# Patient Record
Sex: MALE | Race: OTHER | HISPANIC OR LATINO | Employment: FULL TIME | URBAN - METROPOLITAN AREA
[De-identification: names, ages, dates, MRNs, and addresses within clinical notes are randomized per-mention and may not be internally consistent; named-entity substitution may affect disease eponyms.]

---

## 2018-09-18 ENCOUNTER — ANESTHESIA (INPATIENT)
Dept: PERIOP | Facility: HOSPITAL | Age: 48
DRG: 728 | End: 2018-09-18

## 2018-09-18 ENCOUNTER — APPOINTMENT (EMERGENCY)
Dept: CT IMAGING | Facility: HOSPITAL | Age: 48
DRG: 728 | End: 2018-09-18

## 2018-09-18 ENCOUNTER — ANESTHESIA EVENT (INPATIENT)
Dept: PERIOP | Facility: HOSPITAL | Age: 48
DRG: 728 | End: 2018-09-18

## 2018-09-18 ENCOUNTER — HOSPITAL ENCOUNTER (INPATIENT)
Facility: HOSPITAL | Age: 48
LOS: 2 days | Discharge: HOME | DRG: 728 | End: 2018-09-20
Attending: EMERGENCY MEDICINE | Admitting: FAMILY MEDICINE

## 2018-09-18 DIAGNOSIS — L02.91 ABSCESS: ICD-10-CM

## 2018-09-18 DIAGNOSIS — N49.2 SCROTAL WALL ABSCESS: Primary | ICD-10-CM

## 2018-09-18 LAB
ALBUMIN SERPL BCP-MCNC: 3.5 G/DL (ref 3.5–5)
ALP SERPL-CCNC: 97 U/L (ref 46–116)
ALT SERPL W P-5'-P-CCNC: 17 U/L (ref 12–78)
ANION GAP SERPL CALCULATED.3IONS-SCNC: 7 MMOL/L (ref 4–13)
APTT PPP: 26 SECONDS (ref 24–36)
AST SERPL W P-5'-P-CCNC: 13 U/L (ref 5–45)
BACTERIA UR QL AUTO: ABNORMAL /HPF
BASOPHILS # BLD AUTO: 0.03 THOUSANDS/ΜL (ref 0–0.1)
BASOPHILS NFR BLD AUTO: 0 % (ref 0–1)
BILIRUB SERPL-MCNC: 0.63 MG/DL (ref 0.2–1)
BILIRUB UR QL STRIP: NEGATIVE
BUN SERPL-MCNC: 16 MG/DL (ref 5–25)
CALCIUM SERPL-MCNC: 8.6 MG/DL (ref 8.3–10.1)
CHLORIDE SERPL-SCNC: 103 MMOL/L (ref 100–108)
CLARITY UR: CLEAR
CO2 SERPL-SCNC: 28 MMOL/L (ref 21–32)
COLOR UR: YELLOW
CREAT SERPL-MCNC: 1.1 MG/DL (ref 0.6–1.3)
EOSINOPHIL # BLD AUTO: 0.08 THOUSAND/ΜL (ref 0–0.61)
EOSINOPHIL NFR BLD AUTO: 1 % (ref 0–6)
ERYTHROCYTE [DISTWIDTH] IN BLOOD BY AUTOMATED COUNT: 12.3 % (ref 11.6–15.1)
GFR SERPL CREATININE-BSD FRML MDRD: 78 ML/MIN/1.73SQ M
GLUCOSE SERPL-MCNC: 93 MG/DL (ref 65–140)
GLUCOSE UR STRIP-MCNC: NEGATIVE MG/DL
HCT VFR BLD AUTO: 40.8 % (ref 36.5–49.3)
HGB BLD-MCNC: 13.3 G/DL (ref 12–17)
HGB UR QL STRIP.AUTO: NEGATIVE
IMM GRANULOCYTES # BLD AUTO: 0.02 THOUSAND/UL (ref 0–0.2)
IMM GRANULOCYTES NFR BLD AUTO: 0 % (ref 0–2)
INR PPP: 1.1 (ref 0.86–1.17)
KETONES UR STRIP-MCNC: NEGATIVE MG/DL
LACTATE SERPL-SCNC: 0.5 MMOL/L (ref 0.5–2)
LACTATE SERPL-SCNC: 0.5 MMOL/L (ref 0.5–2)
LEUKOCYTE ESTERASE UR QL STRIP: NEGATIVE
LYMPHOCYTES # BLD AUTO: 1.76 THOUSANDS/ΜL (ref 0.6–4.47)
LYMPHOCYTES NFR BLD AUTO: 20 % (ref 14–44)
MCH RBC QN AUTO: 29.8 PG (ref 26.8–34.3)
MCHC RBC AUTO-ENTMCNC: 32.6 G/DL (ref 31.4–37.4)
MCV RBC AUTO: 91 FL (ref 82–98)
MONOCYTES # BLD AUTO: 0.8 THOUSAND/ΜL (ref 0.17–1.22)
MONOCYTES NFR BLD AUTO: 9 % (ref 4–12)
NEUTROPHILS # BLD AUTO: 6.2 THOUSANDS/ΜL (ref 1.85–7.62)
NEUTS SEG NFR BLD AUTO: 70 % (ref 43–75)
NITRITE UR QL STRIP: NEGATIVE
NON-SQ EPI CELLS URNS QL MICRO: ABNORMAL /HPF
NRBC BLD AUTO-RTO: 0 /100 WBCS
PH UR STRIP.AUTO: 7 [PH] (ref 4.5–8)
PLATELET # BLD AUTO: 276 THOUSANDS/UL (ref 149–390)
PMV BLD AUTO: 8.9 FL (ref 8.9–12.7)
POTASSIUM SERPL-SCNC: 3.6 MMOL/L (ref 3.5–5.3)
PROT SERPL-MCNC: 7.8 G/DL (ref 6.4–8.2)
PROT UR STRIP-MCNC: ABNORMAL MG/DL
PROTHROMBIN TIME: 14.3 SECONDS (ref 11.8–14.2)
RBC # BLD AUTO: 4.47 MILLION/UL (ref 3.88–5.62)
RBC #/AREA URNS AUTO: ABNORMAL /HPF
SODIUM SERPL-SCNC: 138 MMOL/L (ref 136–145)
SP GR UR STRIP.AUTO: 1.02 (ref 1–1.03)
UROBILINOGEN UR QL STRIP.AUTO: 2 E.U./DL
WBC # BLD AUTO: 8.89 THOUSAND/UL (ref 4.31–10.16)
WBC #/AREA URNS AUTO: ABNORMAL /HPF

## 2018-09-18 PROCEDURE — 99253 IP/OBS CNSLTJ NEW/EST LOW 45: CPT | Performed by: PHYSICIAN ASSISTANT

## 2018-09-18 PROCEDURE — 87040 BLOOD CULTURE FOR BACTERIA: CPT | Performed by: PHYSICIAN ASSISTANT

## 2018-09-18 PROCEDURE — 96375 TX/PRO/DX INJ NEW DRUG ADDON: CPT

## 2018-09-18 PROCEDURE — 85025 COMPLETE CBC W/AUTO DIFF WBC: CPT | Performed by: PHYSICIAN ASSISTANT

## 2018-09-18 PROCEDURE — 99284 EMERGENCY DEPT VISIT MOD MDM: CPT

## 2018-09-18 PROCEDURE — 11004 DBRDMT SKIN XTRNL GENT&PER: CPT | Performed by: UROLOGY

## 2018-09-18 PROCEDURE — 0V95XZZ DRAINAGE OF SCROTUM, EXTERNAL APPROACH: ICD-10-PCS | Performed by: UROLOGY

## 2018-09-18 PROCEDURE — 81001 URINALYSIS AUTO W/SCOPE: CPT | Performed by: PHYSICIAN ASSISTANT

## 2018-09-18 PROCEDURE — 88304 TISSUE EXAM BY PATHOLOGIST: CPT | Performed by: PATHOLOGY

## 2018-09-18 PROCEDURE — 85610 PROTHROMBIN TIME: CPT | Performed by: PHYSICIAN ASSISTANT

## 2018-09-18 PROCEDURE — 36415 COLL VENOUS BLD VENIPUNCTURE: CPT | Performed by: PHYSICIAN ASSISTANT

## 2018-09-18 PROCEDURE — 72193 CT PELVIS W/DYE: CPT

## 2018-09-18 PROCEDURE — 83605 ASSAY OF LACTIC ACID: CPT | Performed by: PHYSICIAN ASSISTANT

## 2018-09-18 PROCEDURE — 87205 SMEAR GRAM STAIN: CPT | Performed by: UROLOGY

## 2018-09-18 PROCEDURE — 96365 THER/PROPH/DIAG IV INF INIT: CPT

## 2018-09-18 PROCEDURE — 85730 THROMBOPLASTIN TIME PARTIAL: CPT | Performed by: PHYSICIAN ASSISTANT

## 2018-09-18 PROCEDURE — 87070 CULTURE OTHR SPECIMN AEROBIC: CPT | Performed by: UROLOGY

## 2018-09-18 PROCEDURE — 80053 COMPREHEN METABOLIC PANEL: CPT | Performed by: PHYSICIAN ASSISTANT

## 2018-09-18 PROCEDURE — 87075 CULTR BACTERIA EXCEPT BLOOD: CPT | Performed by: UROLOGY

## 2018-09-18 PROCEDURE — 54700 I&D EPDIDYMS TSTIS&/SCROT SP: CPT | Performed by: UROLOGY

## 2018-09-18 PROCEDURE — 99223 1ST HOSP IP/OBS HIGH 75: CPT | Performed by: INTERNAL MEDICINE

## 2018-09-18 RX ORDER — LIDOCAINE HYDROCHLORIDE 10 MG/ML
INJECTION, SOLUTION INFILTRATION; PERINEURAL AS NEEDED
Status: DISCONTINUED | OUTPATIENT
Start: 2018-09-18 | End: 2018-09-18 | Stop reason: SURG

## 2018-09-18 RX ORDER — FENTANYL CITRATE 50 UG/ML
INJECTION, SOLUTION INTRAMUSCULAR; INTRAVENOUS AS NEEDED
Status: DISCONTINUED | OUTPATIENT
Start: 2018-09-18 | End: 2018-09-18 | Stop reason: SURG

## 2018-09-18 RX ORDER — MORPHINE SULFATE 2 MG/ML
2 INJECTION, SOLUTION INTRAMUSCULAR; INTRAVENOUS
Status: DISCONTINUED | OUTPATIENT
Start: 2018-09-18 | End: 2018-09-19

## 2018-09-18 RX ORDER — OXYCODONE HYDROCHLORIDE AND ACETAMINOPHEN 5; 325 MG/1; MG/1
2 TABLET ORAL EVERY 4 HOURS PRN
Status: DISCONTINUED | OUTPATIENT
Start: 2018-09-18 | End: 2018-09-19

## 2018-09-18 RX ORDER — OXYCODONE HYDROCHLORIDE AND ACETAMINOPHEN 5; 325 MG/1; MG/1
1 TABLET ORAL EVERY 4 HOURS PRN
Status: DISCONTINUED | OUTPATIENT
Start: 2018-09-18 | End: 2018-09-19

## 2018-09-18 RX ORDER — PROPOFOL 10 MG/ML
INJECTION, EMULSION INTRAVENOUS AS NEEDED
Status: DISCONTINUED | OUTPATIENT
Start: 2018-09-18 | End: 2018-09-18 | Stop reason: SURG

## 2018-09-18 RX ORDER — MIDAZOLAM HYDROCHLORIDE 1 MG/ML
INJECTION INTRAMUSCULAR; INTRAVENOUS AS NEEDED
Status: DISCONTINUED | OUTPATIENT
Start: 2018-09-18 | End: 2018-09-18 | Stop reason: SURG

## 2018-09-18 RX ORDER — HYDROMORPHONE HYDROCHLORIDE 2 MG/ML
INJECTION, SOLUTION INTRAMUSCULAR; INTRAVENOUS; SUBCUTANEOUS AS NEEDED
Status: DISCONTINUED | OUTPATIENT
Start: 2018-09-18 | End: 2018-09-18 | Stop reason: SURG

## 2018-09-18 RX ORDER — KETOROLAC TROMETHAMINE 30 MG/ML
15 INJECTION, SOLUTION INTRAMUSCULAR; INTRAVENOUS EVERY 6 HOURS PRN
Status: DISCONTINUED | OUTPATIENT
Start: 2018-09-18 | End: 2018-09-19

## 2018-09-18 RX ORDER — SODIUM CHLORIDE 9 MG/ML
75 INJECTION, SOLUTION INTRAVENOUS CONTINUOUS
Status: DISCONTINUED | OUTPATIENT
Start: 2018-09-18 | End: 2018-09-19

## 2018-09-18 RX ORDER — ACETAMINOPHEN 325 MG/1
650 TABLET ORAL EVERY 6 HOURS PRN
Status: DISCONTINUED | OUTPATIENT
Start: 2018-09-18 | End: 2018-09-20 | Stop reason: HOSPADM

## 2018-09-18 RX ORDER — SODIUM CHLORIDE 9 MG/ML
125 INJECTION, SOLUTION INTRAVENOUS CONTINUOUS
Status: DISCONTINUED | OUTPATIENT
Start: 2018-09-18 | End: 2018-09-18

## 2018-09-18 RX ORDER — ONDANSETRON 2 MG/ML
4 INJECTION INTRAMUSCULAR; INTRAVENOUS ONCE AS NEEDED
Status: DISCONTINUED | OUTPATIENT
Start: 2018-09-18 | End: 2018-09-18 | Stop reason: HOSPADM

## 2018-09-18 RX ORDER — FENTANYL CITRATE/PF 50 MCG/ML
25 SYRINGE (ML) INJECTION
Status: DISCONTINUED | OUTPATIENT
Start: 2018-09-18 | End: 2018-09-18 | Stop reason: HOSPADM

## 2018-09-18 RX ORDER — ONDANSETRON 2 MG/ML
4 INJECTION INTRAMUSCULAR; INTRAVENOUS EVERY 6 HOURS PRN
Status: DISCONTINUED | OUTPATIENT
Start: 2018-09-18 | End: 2018-09-20

## 2018-09-18 RX ORDER — KETOROLAC TROMETHAMINE 30 MG/ML
15 INJECTION, SOLUTION INTRAMUSCULAR; INTRAVENOUS ONCE
Status: COMPLETED | OUTPATIENT
Start: 2018-09-18 | End: 2018-09-18

## 2018-09-18 RX ORDER — SODIUM CHLORIDE 9 MG/ML
100 INJECTION, SOLUTION INTRAVENOUS CONTINUOUS
Status: DISCONTINUED | OUTPATIENT
Start: 2018-09-18 | End: 2018-09-18

## 2018-09-18 RX ORDER — MAGNESIUM HYDROXIDE 1200 MG/15ML
LIQUID ORAL AS NEEDED
Status: DISCONTINUED | OUTPATIENT
Start: 2018-09-18 | End: 2018-09-18 | Stop reason: HOSPADM

## 2018-09-18 RX ORDER — SODIUM CHLORIDE 9 MG/ML
125 INJECTION, SOLUTION INTRAVENOUS CONTINUOUS
Status: CANCELLED | OUTPATIENT
Start: 2018-09-18

## 2018-09-18 RX ORDER — MORPHINE SULFATE 4 MG/ML
4 INJECTION, SOLUTION INTRAMUSCULAR; INTRAVENOUS EVERY 2 HOUR PRN
Status: DISCONTINUED | OUTPATIENT
Start: 2018-09-18 | End: 2018-09-19

## 2018-09-18 RX ADMIN — FENTANYL CITRATE 25 MCG: 50 INJECTION, SOLUTION INTRAMUSCULAR; INTRAVENOUS at 18:50

## 2018-09-18 RX ADMIN — SODIUM CHLORIDE 75 ML/HR: 0.9 INJECTION, SOLUTION INTRAVENOUS at 12:45

## 2018-09-18 RX ADMIN — HYDROMORPHONE HYDROCHLORIDE 0.5 MG: 2 INJECTION, SOLUTION INTRAMUSCULAR; INTRAVENOUS; SUBCUTANEOUS at 18:16

## 2018-09-18 RX ADMIN — SODIUM CHLORIDE 125 ML/HR: 0.9 INJECTION, SOLUTION INTRAVENOUS at 03:49

## 2018-09-18 RX ADMIN — PIPERACILLIN SODIUM AND TAZOBACTAM SODIUM 3.38 G: 36; 4.5 INJECTION, POWDER, FOR SOLUTION INTRAVENOUS at 02:59

## 2018-09-18 RX ADMIN — Medication 3.38 G: at 08:07

## 2018-09-18 RX ADMIN — ONDANSETRON HYDROCHLORIDE 4 MG: 2 INJECTION, SOLUTION INTRAVENOUS at 18:07

## 2018-09-18 RX ADMIN — Medication 3.38 G: at 14:31

## 2018-09-18 RX ADMIN — LIDOCAINE HYDROCHLORIDE 60 MG: 10 INJECTION, SOLUTION INFILTRATION; PERINEURAL at 17:58

## 2018-09-18 RX ADMIN — KETOROLAC TROMETHAMINE 15 MG: 30 INJECTION, SOLUTION INTRAMUSCULAR at 01:51

## 2018-09-18 RX ADMIN — FENTANYL CITRATE 50 MCG: 50 INJECTION, SOLUTION INTRAMUSCULAR; INTRAVENOUS at 18:16

## 2018-09-18 RX ADMIN — FENTANYL CITRATE 50 MCG: 50 INJECTION, SOLUTION INTRAMUSCULAR; INTRAVENOUS at 18:00

## 2018-09-18 RX ADMIN — OXYCODONE AND ACETAMINOPHEN 1 TABLET: 5; 325 TABLET ORAL at 21:29

## 2018-09-18 RX ADMIN — IOHEXOL 100 ML: 350 INJECTION, SOLUTION INTRAVENOUS at 02:35

## 2018-09-18 RX ADMIN — DEXAMETHASONE SODIUM PHOSPHATE 4 MG: 10 INJECTION INTRAMUSCULAR; INTRAVENOUS at 18:07

## 2018-09-18 RX ADMIN — SODIUM CHLORIDE 75 ML/HR: 0.9 INJECTION, SOLUTION INTRAVENOUS at 19:19

## 2018-09-18 RX ADMIN — MIDAZOLAM 2 MG: 1 INJECTION INTRAMUSCULAR; INTRAVENOUS at 17:53

## 2018-09-18 RX ADMIN — FENTANYL CITRATE 25 MCG: 50 INJECTION, SOLUTION INTRAMUSCULAR; INTRAVENOUS at 18:55

## 2018-09-18 RX ADMIN — HYDROMORPHONE HYDROCHLORIDE 0.5 MG: 2 INJECTION, SOLUTION INTRAMUSCULAR; INTRAVENOUS; SUBCUTANEOUS at 18:11

## 2018-09-18 RX ADMIN — PROPOFOL 200 MG: 10 INJECTION, EMULSION INTRAVENOUS at 17:58

## 2018-09-18 RX ADMIN — VANCOMYCIN HYDROCHLORIDE 1500 MG: 1 INJECTION, POWDER, LYOPHILIZED, FOR SOLUTION INTRAVENOUS at 03:48

## 2018-09-18 RX ADMIN — Medication 3.38 G: at 21:29

## 2018-09-18 RX ADMIN — KETOROLAC TROMETHAMINE 15 MG: 30 INJECTION, SOLUTION INTRAMUSCULAR at 10:11

## 2018-09-18 NOTE — LETTER
[id number] [facility]  Dept: [phone]    September 20, 2018     Patient: Alfa Cedeño   YOB: 1970   Date of Visit: 9/18/2018       To Whom it May Concern:    Alfa Cedeño is under my professional care  He was seen in the hospital from 9/18/2018   to 09/20/18  Patient may return to work on September 24, 2018  If you have any questions or concerns, please don't hesitate to call           Sincerely,          Beth Mckee, DO

## 2018-09-18 NOTE — OP NOTE
OPERATIVE REPORT  PATIENT NAME: Morro Sue    :  1970  MRN: [id number]  Pt Location: [facility] OR ROOM 03    SURGERY DATE: 2018    Surgeon(s) and Role:     * Sage Holguin MD - Primary    Preop Diagnosis:  Abscess [L02 91]  Scrotal wall abscess [N49 2]    Post-Op Diagnosis Codes:     * Abscess [L02 91]     * Scrotal wall abscess [N49 2]    Procedure(s) (LRB):  INCISION AND DRAINAGE (I&D) SCROTUM (N/A)    Specimen(s):  ID Type Source Tests Collected by Time Destination   1 : Forgein body inside left scrotal wall abscess  Tissue Groin TISSUE EXAM Sage Holguin MD 2018    A : Aerobic and Anaerobic cultures of Left Scrotal Abscess Wound Groin ANAEROBIC CULTURE AND GRAM STAIN, WOUND CULTURE Sage Holguin MD 2018        Estimated Blood Loss:   Minimal    Drains:       Anesthesia Type:   General    Operative Indications:  Abscess [L02 91]  Scrotal wall abscess [N49 2]      Operative Findings:  3 cm abscess left posterior scrotal wall  There appears to be a small foreign body inside of it, described below    Complications:   None    Procedure and Technique:  After the patient was placed under adequate general anesthesia, he was prepped and draped using Betadine solution in the  supine position, with frog-leg  A small incision was made over the abscess on the left lateral scrotal wall, almost to the edge with the thigh  It was taken down through the thickened subcutaneous tissue to enter a small abscess cavity with necrotic tissue, some fat necrosis, and a small amount of purulence  As I irrigated out the abscess cavity, I could see what appeared to be a foreign body inside  It looked to be about 1 cm long, oblong shaped, something like an eggshell with the hollow Center, but made of material that  looked like a thin wall of plastic  It was definitely solid, although it fragmented some as I removed it  I sent it to pathology for identification      I irrigated the wound carefully and removed any necrotic tissue  The cavity was about 2 x 3 cm in size  Minimal cautery was required just below the skin level  After ensuring no significant purulence, cultures have been taken, and no more necrotic tissue, I packed the wound with 1/2 inch iodoform gauze  A sterile dressing applied, and patient taken to recovery in good condition     I was present for the entire procedure    Patient Disposition:  PACU     SIGNATURE: Andreas Talbert MD  DATE: September 18, 2018  TIME: 6:36 PM

## 2018-09-18 NOTE — ANESTHESIA PREPROCEDURE EVALUATION
Review of Systems/Medical History          Cardiovascular  Negative cardio ROS    Pulmonary  Negative pulmonary ROS        GI/Hepatic    GERD well controlled,          Comment: Scrotal abscess     Endo/Other  Negative endo/other ROS      GYN       Hematology  Negative hematology ROS      Musculoskeletal  Negative musculoskeletal ROS        Neurology  Negative neurology ROS      Psychology           Physical Exam    Airway    Mallampati score: I  TM Distance: >3 FB  Neck ROM: full     Dental   No notable dental hx     Cardiovascular  Comment: Negative ROS, Rhythm: regular, Rate: normal, Cardiovascular exam normal    Pulmonary  Pulmonary exam normal     Other Findings        Anesthesia Plan  ASA Score- 2 Emergent    Anesthesia Type- general with ASA Monitors  Additional Monitors:   Airway Plan:     Comment: Anesthesia consent placed on the chart 9/18/18 at 0800  Plan Factors-    Induction- intravenous  Postoperative Plan-     Informed Consent- Anesthetic plan and risks discussed with patient
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
## 2018-09-18 NOTE — ASSESSMENT & PLAN NOTE
Discharge home per Internal Medicine  Outpatient wound care follow-up with daily packing  Augmentin for ongoing coverage
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
## 2018-09-18 NOTE — ANESTHESIA POSTPROCEDURE EVALUATION
Post-Op Assessment Note      CV Status:  Stable    Mental Status:  Alert and awake    Hydration Status:  Euvolemic    PONV Controlled:  Controlled    Airway Patency:  Patent    Post Op Vitals Reviewed:  Yes              /79 (09/18/18 1904)    Temp 98 1 °F (36 7 °C) (09/18/18 1904)    Pulse 72 (09/18/18 1904)   Resp 14 (09/18/18 1904)    SpO2 96 % (09/18/18 1904)

## 2018-09-19 LAB
ANION GAP SERPL CALCULATED.3IONS-SCNC: 6 MMOL/L (ref 4–13)
BUN SERPL-MCNC: 10 MG/DL (ref 5–25)
CALCIUM SERPL-MCNC: 8.8 MG/DL (ref 8.3–10.1)
CHLORIDE SERPL-SCNC: 104 MMOL/L (ref 100–108)
CO2 SERPL-SCNC: 28 MMOL/L (ref 21–32)
CREAT SERPL-MCNC: 0.88 MG/DL (ref 0.6–1.3)
GFR SERPL CREATININE-BSD FRML MDRD: 101 ML/MIN/1.73SQ M
GLUCOSE SERPL-MCNC: 122 MG/DL (ref 65–140)
POTASSIUM SERPL-SCNC: 4.4 MMOL/L (ref 3.5–5.3)
SODIUM SERPL-SCNC: 138 MMOL/L (ref 136–145)

## 2018-09-19 PROCEDURE — 80048 BASIC METABOLIC PNL TOTAL CA: CPT | Performed by: INTERNAL MEDICINE

## 2018-09-19 PROCEDURE — 99232 SBSQ HOSP IP/OBS MODERATE 35: CPT | Performed by: INTERNAL MEDICINE

## 2018-09-19 RX ORDER — KETOROLAC TROMETHAMINE 30 MG/ML
15 INJECTION, SOLUTION INTRAMUSCULAR; INTRAVENOUS EVERY 6 HOURS PRN
Status: DISCONTINUED | OUTPATIENT
Start: 2018-09-19 | End: 2018-09-20 | Stop reason: HOSPADM

## 2018-09-19 RX ORDER — MORPHINE SULFATE 4 MG/ML
4 INJECTION, SOLUTION INTRAMUSCULAR; INTRAVENOUS EVERY 6 HOURS PRN
Status: DISCONTINUED | OUTPATIENT
Start: 2018-09-19 | End: 2018-09-19

## 2018-09-19 RX ORDER — MORPHINE SULFATE 4 MG/ML
4 INJECTION, SOLUTION INTRAMUSCULAR; INTRAVENOUS EVERY 4 HOURS PRN
Status: DISCONTINUED | OUTPATIENT
Start: 2018-09-19 | End: 2018-09-20

## 2018-09-19 RX ADMIN — KETOROLAC TROMETHAMINE 15 MG: 30 INJECTION, SOLUTION INTRAMUSCULAR at 23:32

## 2018-09-19 RX ADMIN — Medication 3.38 G: at 08:35

## 2018-09-19 RX ADMIN — OXYCODONE AND ACETAMINOPHEN 1 TABLET: 5; 325 TABLET ORAL at 08:36

## 2018-09-19 RX ADMIN — MORPHINE SULFATE 4 MG: 4 INJECTION INTRAVENOUS at 12:55

## 2018-09-19 RX ADMIN — Medication 3.38 G: at 21:22

## 2018-09-19 RX ADMIN — SODIUM CHLORIDE 75 ML/HR: 0.9 INJECTION, SOLUTION INTRAVENOUS at 08:38

## 2018-09-19 RX ADMIN — Medication 3.38 G: at 15:42

## 2018-09-19 RX ADMIN — OXYCODONE AND ACETAMINOPHEN 1 TABLET: 5; 325 TABLET ORAL at 03:12

## 2018-09-19 RX ADMIN — MORPHINE SULFATE 4 MG: 4 INJECTION INTRAVENOUS at 18:45

## 2018-09-19 RX ADMIN — Medication 3.38 G: at 03:13

## 2018-09-19 NOTE — PROGRESS NOTES
Progress Note - Javy Gunn 51 y o  male MRN: [id number]    Unit/Bed#: [facility] 2 -01 Encounter: [id number]    Assessment/Plan:    Scrotal abscess   status post I and D, and foreign body removal continue Zosyn in monitor cultures, blood cultures no growth 24 hours wound culture Gram stain gram-positive cocci    Intractable testicular pain  continue Toradol and morphine    Subjective:   Right scrotum pain persists, denies chest pain shortness of breath nausea vomiting diarrhea no fever chills appetite is okay    Objective:     Vitals: Blood pressure 122/76, pulse 59, temperature (!) 97 2 °F (36 2 °C), temperature source Temporal, resp  rate 17, height 6' 3" (1 905 m), weight 95 1 kg (209 lb 10 5 oz), SpO2 97 %  ,Body mass index is 26 21 kg/m²          Results from last 7 days  Lab Units 09/18/18  0147 09/18/18  0146   WBC Thousand/uL  --  8 89   HEMOGLOBIN g/dL  --  13 3   HEMATOCRIT %  --  40 8   PLATELETS Thousands/uL  --  276   INR  1 10  --        Results from last 7 days  Lab Units 09/19/18  0510 09/18/18  0147   SODIUM mmol/L 138 138   POTASSIUM mmol/L 4 4 3 6   CHLORIDE mmol/L 104 103   CO2 mmol/L 28 28   BUN mg/dL 10 16   CREATININE mg/dL 0 88 1 10   CALCIUM mg/dL 8 8 8 6   ALK PHOS U/L  --  97   ALT U/L  --  17   AST U/L  --  13       Scheduled Meds:    Current Facility-Administered Medications:  acetaminophen 650 mg Oral Q6H PRN Lang Dia PA-C    ketorolac 15 mg Intravenous Q6H PRN Lang Dia PA-C    morphine injection 4 mg Intravenous Q2H PRN Wayne Bella MD    morphine injection 2 mg Intravenous Q3H PRN Walker Chamberlain PA-C    ondansetron 4 mg Intravenous Q6H PRN Lang Dia PA-C    oxyCODONE-acetaminophen 1 tablet Oral Q4H PRN Wayne Bella MD    oxyCODONE-acetaminophen 2 tablet Oral Q4H PRN Wayne Bella MD    piperacillin-tazobactam 3 375 g Intravenous Q6H Lang Dia PA-C Last Rate: Stopped (09/19/18 0905)   sodium chloride 75 mL/hr Intravenous Continuous Saida Harvey Mikhail Heck DO Last Rate: 75 mL/hr (09/19/18 4484)       Continuous Infusions:    sodium chloride 75 mL/hr Last Rate: 75 mL/hr (09/19/18 9310)     Physical exam:  General appearance:  Alert no distress oriented x3 interaction appropriate  Head/Eyes:  Nonicteric PERRL EOMI  Neck: supple  Lungs: CTA bilateral no wheezing rhonchi or rales  Heart: normal S1 S2 regular  Abdomen: Soft nontender with bowel sounds  Extremities: no edema scrotum gauze with packing  Skin: no rash    Invasive Devices     Peripheral Intravenous Line            Peripheral IV 09/18/18 Right Antecubital 1 day                      Counseling / Coordination of Care  Total floor / unit time spent today 30  minutes  Greater than 50% of total time was spent with the patient and / or family counseling and / or coordination of care    A description of the counseling / coordination of care:

## 2018-09-19 NOTE — PHYSICIAN ADVISOR
Current patient class: Inpatient  The patient is currently on Hospital Day: 1 at [address] [facility]      The patient was admitted to the hospital at [address] [facility] on 9/18/18 for the following diagnosis:  Abscess [L02 91]  Scrotal wall abscess [N49 2]       There is documentation in the medical record of an expected length of stay of at least 2 midnights  The patient is therefore expected to satisfy the 2 midnight benchmark and given the 2 midnight presumption is appropriate for INPATIENT ADMISSION  Given this expectation of a satisfying stay, CMS instructs us that the patient is most often appropriate for inpatient admission under part A provided medical necessity is documented in the chart  After review of the relevant documentation, labs, vital signs and test results, the patient is appropriate for INPATIENT ADMISSION  Admission to the hospital as an inpatient is a complex decision making process which requires the practitioner to consider the patients presenting complaint, history and physical examination and all relevant testing  With this in mind, in this case, the patient was deemed appropriate for INPATIENT ADMISSION  After review of the documentation and testing available at the time of the admission I concur with this clinical determination of medical necessity  Rationale is as follows: The patient is a 51 yrs old Male who presented to the ED at 9/18/2018  1:18 AM with a chief complaint of Abscess (Pt states "lump on side of testicle  Pus and blood came out of it")     Given the need for further hospitalization, and along with the documentation of medical necessity present in the chart, the patient is appropriate for inpatient admission  The patient is expected to satisfy the 2 midnight benchmark, and will require further acute medical care  The patient does have comorbid conditions which increases the risk for significant adverse outcome   Given this the patient is appropriate for inpatient admission  The patients vitals on arrival were ED Triage Vitals   Temperature Pulse Respirations Blood Pressure SpO2   09/18/18 0120 09/18/18 0120 09/18/18 0120 09/18/18 0120 09/18/18 0120   98 3 °F (36 8 °C) 85 18 137/87 98 %      Temp Source Heart Rate Source Patient Position - Orthostatic VS BP Location FiO2 (%)   09/18/18 0418 09/18/18 0759 09/18/18 0418 09/18/18 0418 --   Tympanic Monitor Lying Left arm       Pain Score       09/18/18 0120       8           History reviewed  No pertinent past medical history  History reviewed  No pertinent surgical history          Consults have been placed to:   IP CONSULT TO UROLOGY    Vitals:    09/18/18 1904 09/18/18 1928 09/18/18 2000 09/18/18 2100   BP: 123/79 123/88 117/75 117/65   BP Location:  Left arm Left arm Left arm   Pulse: 72 56 65 69   Resp: 14 16 16 16   Temp: 98 1 °F (36 7 °C) (!) 97 3 °F (36 3 °C) 97 9 °F (36 6 °C) 97 9 °F (36 6 °C)   TempSrc:  Temporal Tympanic Tympanic   SpO2: 96% 95% 96% 98%   Weight:       Height:           Most recent labs:    Recent Labs      09/18/18   0146  09/18/18   0147   WBC  8 89   --    HGB  13 3   --    HCT  40 8   --    PLT  276   --    K   --   3 6   NA   --   138   CALCIUM   --   8 6   BUN   --   16   CREATININE   --   1 10   INR   --   1 10   AST   --   13   ALT   --   17   ALKPHOS   --   97       Scheduled Meds:  Current Facility-Administered Medications:  acetaminophen 650 mg Oral Q6H PRN Alfonso Lyons PA-C    ketorolac 15 mg Intravenous Q6H PRN Alfonso Lyons PA-C    morphine injection 4 mg Intravenous Q2H PRN Candida Miguel MD    morphine injection 2 mg Intravenous Q3H PRN Walker Chamberlain PA-C    ondansetron 4 mg Intravenous Q6H PRN Alfonso Lyons PA-C    oxyCODONE-acetaminophen 1 tablet Oral Q4H PRN Candida Miguel MD    oxyCODONE-acetaminophen 2 tablet Oral Q4H PRN Candida Miguel MD    piperacillin-tazobactam 3 375 g Intravenous Q6H Alfonso LIMA Lyons Last Rate: 3 375 g (09/18/18 2129) sodium chloride 75 mL/hr Intravenous Continuous Cy Dynes, DO Last Rate: 75 mL/hr (09/18/18 1919)     Continuous Infusions:  sodium chloride 75 mL/hr Last Rate: 75 mL/hr (09/18/18 1919)     PRN Meds:   acetaminophen    ketorolac    morphine injection    morphine injection    ondansetron    oxyCODONE-acetaminophen    oxyCODONE-acetaminophen    Surgical procedures (if appropriate):  Procedure(s):  INCISION AND DRAINAGE (I&D) SCROTUM

## 2018-09-20 VITALS
HEART RATE: 67 BPM | OXYGEN SATURATION: 98 % | WEIGHT: 209.66 LBS | BODY MASS INDEX: 26.07 KG/M2 | RESPIRATION RATE: 18 BRPM | SYSTOLIC BLOOD PRESSURE: 116 MMHG | TEMPERATURE: 97.5 F | DIASTOLIC BLOOD PRESSURE: 76 MMHG | HEIGHT: 75 IN

## 2018-09-20 LAB
BACTERIA WND AEROBE CULT: NORMAL
GRAM STN SPEC: NORMAL
GRAM STN SPEC: NORMAL

## 2018-09-20 PROCEDURE — 99239 HOSP IP/OBS DSCHRG MGMT >30: CPT | Performed by: INTERNAL MEDICINE

## 2018-09-20 PROCEDURE — 99024 POSTOP FOLLOW-UP VISIT: CPT | Performed by: PHYSICIAN ASSISTANT

## 2018-09-20 RX ORDER — AMOXICILLIN AND CLAVULANATE POTASSIUM 875; 125 MG/1; MG/1
1 TABLET, FILM COATED ORAL EVERY 12 HOURS SCHEDULED
Status: DISCONTINUED | OUTPATIENT
Start: 2018-09-21 | End: 2018-09-20 | Stop reason: HOSPADM

## 2018-09-20 RX ORDER — ACETAMINOPHEN 325 MG/1
TABLET ORAL
Qty: 30 TABLET | Refills: 0
Start: 2018-09-20 | End: 2022-03-23

## 2018-09-20 RX ORDER — TRAMADOL HYDROCHLORIDE 50 MG/1
50 TABLET ORAL EVERY 6 HOURS PRN
Status: DISCONTINUED | OUTPATIENT
Start: 2018-09-20 | End: 2018-09-20 | Stop reason: HOSPADM

## 2018-09-20 RX ORDER — AMOXICILLIN AND CLAVULANATE POTASSIUM 875; 125 MG/1; MG/1
1 TABLET, FILM COATED ORAL EVERY 12 HOURS SCHEDULED
Qty: 16 TABLET | Refills: 0 | Status: SHIPPED | OUTPATIENT
Start: 2018-09-21 | End: 2018-09-29

## 2018-09-20 RX ORDER — TRAMADOL HYDROCHLORIDE 50 MG/1
50 TABLET ORAL EVERY 6 HOURS PRN
Qty: 24 TABLET | Refills: 0 | Status: SHIPPED | OUTPATIENT
Start: 2018-09-20 | End: 2018-09-30

## 2018-09-20 RX ADMIN — Medication 3.38 G: at 03:36

## 2018-09-20 RX ADMIN — MORPHINE SULFATE 4 MG: 4 INJECTION INTRAVENOUS at 08:59

## 2018-09-20 RX ADMIN — Medication 3.38 G: at 15:42

## 2018-09-20 RX ADMIN — Medication 3.38 G: at 08:54

## 2018-09-20 RX ADMIN — KETOROLAC TROMETHAMINE 15 MG: 30 INJECTION, SOLUTION INTRAMUSCULAR at 11:40

## 2018-09-20 NOTE — NURSING NOTE
IV removed prior to discharge  AVS provided to patient along with written scripts for Augmentin and Tramadol  Patient aware to schedule follow-up appts with wound care center and Urology as follow up  Pt discharged to home with belongings

## 2018-09-20 NOTE — PROGRESS NOTES
Progress Note - Urology  Gallito Emerson 1970, 51 y o  male MRN: [id number]    Unit/Bed#: [facility] 68 2 -01 Encounter: [id number]    Scrotal wall abscess   Assessment & Plan     Discharge home per Internal Medicine  Outpatient wound care follow-up with daily packing  Augmentin for ongoing coverage  See Dr Judit Morrow in office once healed  Urology will sign off but remain available for any further inpatient needs  Please feel free to call with questions or concerns  Bedside rounds performed with Ghislaine Zamora RN  Discussed with Dr Judy Barron  Subjective/Objective     Subjective:    Patient reports he is feeling well  Has minimal pain except during dressing changes  Has been ambulating well  Tolerating pain with oral pain medications  Minimal drainage on the dressing in between packing changes  Has been doing packing b i d  As an inpatient  Reports he is voiding  Review of Systems   Constitutional: Negative for activity change and appetite change  HENT: Negative for congestion and ear pain  Eyes: Negative for pain  Respiratory: Negative for cough and shortness of breath  Cardiovascular: Negative for chest pain and palpitations  Gastrointestinal: Negative for abdominal distention, abdominal pain, blood in stool, constipation, diarrhea and nausea  Genitourinary: Negative for difficulty urinating, dysuria, flank pain, frequency, hematuria, penile pain, penile swelling and scrotal swelling  Musculoskeletal: Negative for arthralgias and myalgias  Skin: Negative for rash  Allergic/Immunologic: Negative for immunocompromised state  Neurological: Negative for dizziness and headaches  Hematological: Negative for adenopathy  Does not bruise/bleed easily  Psychiatric/Behavioral: Negative for agitation  The patient is not nervous/anxious  Objective:  Vitals: Blood pressure 124/80, pulse 58, temperature 97 8 °F (36 6 °C), temperature source Temporal, resp   rate 17, height 6' 3" (1 905 m), weight 95 1 kg (209 lb 10 5 oz), SpO2 97 %  ,Body mass index is 26 21 kg/m²  Intake/Output Summary (Last 24 hours) at 09/20/18 1258  Last data filed at 09/20/18 0932   Gross per 24 hour   Intake              460 ml   Output                0 ml   Net              460 ml     Invasive Devices     Peripheral Intravenous Line            Peripheral IV 09/18/18 Right Antecubital 2 days                Physical Exam   Constitutional: He is oriented to person, place, and time  He appears well-developed and well-nourished  He is cooperative  He does not appear ill  No distress  HENT:   Head: Normocephalic and atraumatic  Moist mucous membranes  Eyes: Conjunctivae and EOM are normal    Neck: Normal range of motion  Neck supple  No tracheal deviation present  Cardiovascular: Normal rate, regular rhythm and normal heart sounds  No murmur heard  Pulmonary/Chest: Effort normal and breath sounds normal  No respiratory distress  He has no wheezes  Good airflow bilaterally on deep inspiration  Abdominal: Soft  Bowel sounds are normal  He exhibits no distension and no mass  There is no tenderness  Abdomen thin soft and benign  Genitourinary:   Genitourinary Comments: Uncircumcised penis  Left groin with superficial dressing in place, treat clean dry and intact  Recently changed by nursing  Left intact at this time  Musculoskeletal: Normal range of motion  He exhibits no edema  Neurological: He is alert and oriented to person, place, and time  Skin: Skin is warm and dry  No rash noted  He is not diaphoretic  No erythema  No pallor  Psychiatric: He has a normal mood and affect  His behavior is normal  Judgment and thought content normal    Nursing note and vitals reviewed  History:  History reviewed  No pertinent past medical history    Past Surgical History:   Procedure Laterality Date    INCISION AND DRAINAGE OF WOUND N/A 9/18/2018    Procedure: INCISION AND DRAINAGE (I&D) SCROTUM; Surgeon: Ju Potter MD;  Location: [facility] OR;  Service: Urology     History reviewed  No pertinent family history    Social History     Social History    Marital status: Single     Spouse name: N/A    Number of children: N/A    Years of education: N/A     Social History Main Topics    Smoking status: Never Smoker    Smokeless tobacco: Never Used    Alcohol use Yes      Comment: social    Drug use: No    Sexual activity: Not Asked     Other Topics Concern    None     Social History Narrative    None       Labs:  Recent Labs      09/18/18   0146   WBC  8 89     Recent Labs      09/18/18   0146   HGB  13 3       Recent Labs      09/18/18   0147  09/19/18   0510   CREATININE  1 10  0 88         Ivonne Rahman PA-C  Date: 9/20/2018 Time: 12:58 PM
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
## 2018-09-20 NOTE — PROGRESS NOTES
Progress Note - Catie Chuo 51 y o  male MRN: [id number]    Unit/Bed#: [place] [address] -01 Encounter: [id number]    Assessment/Plan:    Scrotal abscess  status post I&D with foreign body removal, continue Zosyn blood culture no growth, wound culture normal yuri    Intractable testicular pain continue IV morphine, improving    Subjective:   Right scrotum pain persists specially with packing, denies chest pain shortness of breath nausea vomiting diarrhea no fevers chills appetite is okay    Objective:     Vitals: Blood pressure 124/80, pulse 58, temperature 97 8 °F (36 6 °C), temperature source Temporal, resp  rate 17, height 6' 3" (1 905 m), weight 95 1 kg (209 lb 10 5 oz), SpO2 97 %  ,Body mass index is 26 21 kg/m²          Results from last 7 days  Lab Units 09/18/18  0147 09/18/18  0146   WBC Thousand/uL  --  8 89   HEMOGLOBIN g/dL  --  13 3   HEMATOCRIT %  --  40 8   PLATELETS Thousands/uL  --  276   INR  1 10  --        Results from last 7 days  Lab Units 09/19/18  0510 09/18/18  0147   SODIUM mmol/L 138 138   POTASSIUM mmol/L 4 4 3 6   CHLORIDE mmol/L 104 103   CO2 mmol/L 28 28   BUN mg/dL 10 16   CREATININE mg/dL 0 88 1 10   CALCIUM mg/dL 8 8 8 6   ALK PHOS U/L  --  97   ALT U/L  --  17   AST U/L  --  13       Scheduled Meds:    Current Facility-Administered Medications:  acetaminophen 650 mg Oral Q6H PRN Raad Chol, PA-C    ketorolac 15 mg Intravenous Q6H PRN Bindu Beny, DO    morphine injection 4 mg Intravenous Q4H PRN Bindu Beny, DO    ondansetron 4 mg Intravenous Q6H PRN Raad Chol, PA-C    piperacillin-tazobactam 3 375 g Intravenous Q6H Raad Chol, PA-C Last Rate: Stopped (09/20/18 1720)       Continuous Infusions:     Physical exam:  General appearance:  Alert no distress interaction appropriate   Head/Eyes:   Nonicteric PERRL EOMI  Neck:   Supple  Lungs: CTA bilateral no wheezing rhonchi or rales  Heart: normal S1 S2 regular  Abdomen: Soft nontender with bowel sounds  Extremities: no edema scrotum packing with gauze  Skin: no rash    Invasive Devices     Peripheral Intravenous Line            Peripheral IV 09/18/18 Right Antecubital 2 days                    Counseling / Coordination of Care  Total floor / unit time spent today 30   minutes  Greater than 50% of total time was spent with the patient and / or family counseling and / or coordination of care    A description of the counseling / coordination of care:   Discussed with Urology

## 2018-09-21 ENCOUNTER — TELEPHONE (OUTPATIENT)
Dept: UROLOGY | Facility: MEDICAL CENTER | Age: 48
End: 2018-09-21

## 2018-09-21 LAB — BACTERIA SPEC ANAEROBE CULT: NORMAL

## 2018-09-21 NOTE — TELEPHONE ENCOUNTER
After some investigation on the pt's discharge summary pt is to go daily packing to [facility] to have and come to see Dr Ryder Getting in one week   Pt notified and is going to contact wound care

## 2018-09-23 LAB
BACTERIA BLD CULT: NORMAL
BACTERIA BLD CULT: NORMAL

## 2019-02-06 ENCOUNTER — OFFICE VISIT (OUTPATIENT)
Dept: INTERNAL MEDICINE CLINIC | Facility: CLINIC | Age: 49
End: 2019-02-06
Payer: COMMERCIAL

## 2019-02-06 VITALS
TEMPERATURE: 96.7 F | RESPIRATION RATE: 16 BRPM | HEIGHT: 75 IN | BODY MASS INDEX: 25.36 KG/M2 | SYSTOLIC BLOOD PRESSURE: 124 MMHG | WEIGHT: 204 LBS | DIASTOLIC BLOOD PRESSURE: 80 MMHG | HEART RATE: 64 BPM

## 2019-02-06 DIAGNOSIS — Z13.220 SCREENING FOR HYPERLIPIDEMIA: ICD-10-CM

## 2019-02-06 DIAGNOSIS — R35.0 URINARY FREQUENCY: Primary | ICD-10-CM

## 2019-02-06 DIAGNOSIS — Z80.0 FAMILY HISTORY OF COLON CANCER IN MOTHER: ICD-10-CM

## 2019-02-06 DIAGNOSIS — K21.9 GASTROESOPHAGEAL REFLUX DISEASE, ESOPHAGITIS PRESENCE NOT SPECIFIED: ICD-10-CM

## 2019-02-06 PROBLEM — L02.91 ABSCESS: Status: RESOLVED | Noted: 2018-09-18 | Resolved: 2019-02-06

## 2019-02-06 PROBLEM — N49.2 SCROTAL WALL ABSCESS: Status: RESOLVED | Noted: 2018-09-18 | Resolved: 2019-02-06

## 2019-02-06 LAB
SL AMB  POCT GLUCOSE, UA: NEGATIVE
SL AMB LEUKOCYTE ESTERASE,UA: NEGATIVE
SL AMB POCT BILIRUBIN,UA: NEGATIVE
SL AMB POCT BLOOD,UA: NEGATIVE
SL AMB POCT CLARITY,UA: CLEAR
SL AMB POCT COLOR,UA: YELLOW
SL AMB POCT KETONES,UA: NEGATIVE
SL AMB POCT NITRITE,UA: NEGATIVE
SL AMB POCT PH,UA: 7
SL AMB POCT SPECIFIC GRAVITY,UA: 1.01
SL AMB POCT URINE PROTEIN: NEGATIVE
SL AMB POCT UROBILINOGEN: NEGATIVE

## 2019-02-06 PROCEDURE — 81002 URINALYSIS NONAUTO W/O SCOPE: CPT | Performed by: INTERNAL MEDICINE

## 2019-02-06 PROCEDURE — 99203 OFFICE O/P NEW LOW 30 MIN: CPT | Performed by: INTERNAL MEDICINE

## 2019-02-06 NOTE — ASSESSMENT & PLAN NOTE
Check for H pylori antigen, CBC  Continues over-the-counter PPI after the testing  Advised to cut down spicy, acidic food, eat at least 2 hours before going to bed  Does not smoke and no significant use of alcohol

## 2019-02-06 NOTE — PROGRESS NOTES
Assessment/Plan:  Urinalysis done in the office did not show any leukocytes or nitrites  Will check for diabetes as the cause of urinary frequency  He was hesitant about a prostate check  Gastroesophageal reflux disease  Check for H pylori antigen, CBC  Continues over-the-counter PPI after the testing  Advised to cut down spicy, acidic food, eat at least 2 hours before going to bed  Does not smoke and no significant use of alcohol  Given his mom's history of colon cancer, I recommended he go for a screening colonoscopy  Screen for hyperlipidemia  He notes that he got his influenza vaccination in September Diagnoses and all orders for this visit:    Urinary frequency  -     CBC and differential  -     Comprehensive metabolic panel  -     Hemoglobin A1C  -     TSH, 3rd generation with Free T4 reflex  -     POCT urine dip    Gastroesophageal reflux disease, esophagitis presence not specified  -     CBC and differential  -     Comprehensive metabolic panel  -     TSH, 3rd generation with Free T4 reflex  -     H  pylori antigen, stool    Family history of colon cancer in mother  -     Ambulatory referral to Gastroenterology; Future    Screening for hyperlipidemia  -     Lipid Panel with Direct LDL reflex    Other orders  -     esomeprazole (NexIUM) 20 mg capsule; Take 20 mg by mouth every morning before breakfast          Subjective:   Chief Complaint   Patient presents with    [facility]        Patient ID: Gm Coleman is a 51 y o  male  He comes in as a [facility] patient [facility]  He notes that for the last several weeks to months he has noticed increased frequency of urination  No dysuria or discharge in the urethral area  No suprapubic pain  No back pain or fevers  He denies any hesitancy or urgency or dribbling  He has also had on and off acid reflux  He takes over-the-counter Nexium which helps his symptoms for several weeks and the symptoms return      He notes that his mom was diagnosed with colon cancer in her 62s and underwent treatment and then later on was told about recurrence for which she went for more chemotherapy  The following portions of the patient's history were reviewed and updated as appropriate: current medications, past medical history, past social history and past surgical history  PHQ-9 Depression Screening    PHQ-9:    Frequency of the following problems over the past two weeks:       Little interest or pleasure in doing things:  0 - not at all  Feeling down, depressed, or hopeless:  0 - not at all  PHQ-2 Score:  0           Current Outpatient Prescriptions:     esomeprazole (NexIUM) 20 mg capsule, Take 20 mg by mouth every morning before breakfast, Disp: , Rfl:     acetaminophen (TYLENOL) 325 mg tablet, 650 milligrams every 6 hours as needed for pain, Disp: 30 tablet, Rfl: 0    Review of Systems   Constitutional: Negative for fatigue, fever and unexpected weight change  HENT: Negative for ear pain, hearing loss and sore throat  Eyes: Negative for pain and discharge  Respiratory: Negative for cough, chest tightness and shortness of breath  Cardiovascular: Negative for chest pain and palpitations  Gastrointestinal: Negative for abdominal pain, blood in stool, constipation, diarrhea and nausea  Genitourinary: Positive for frequency  Negative for dysuria and hematuria  Musculoskeletal: Negative for arthralgias and joint swelling  Skin: Negative for rash  Allergic/Immunologic: Negative for immunocompromised state  Neurological: Negative for dizziness and headaches  Hematological: Negative for adenopathy  Psychiatric/Behavioral: Negative for confusion and sleep disturbance           Objective:  /80 (BP Location: Left arm, Patient Position: Sitting, Cuff Size: Standard)   Pulse 64   Temp (!) 96 7 °F (35 9 °C) (Tympanic)   Resp 16   Ht 6' 3" (1 905 m)   Wt 92 5 kg (204 lb)   BMI 25 50 kg/m²      Physical Exam   Constitutional: He appears well-developed and well-nourished  HENT:   Head: Normocephalic and atraumatic  Right Ear: Tympanic membrane normal    Left Ear: Tympanic membrane normal    Nose: Nose normal    Mouth/Throat: Oropharynx is clear and moist  No posterior oropharyngeal edema or posterior oropharyngeal erythema  Eyes: Pupils are equal, round, and reactive to light  Conjunctivae are normal  Right eye exhibits no discharge  Neck: Normal range of motion  Neck supple  No thyromegaly present  Cardiovascular: Normal rate, regular rhythm, S1 normal, S2 normal and normal heart sounds  PMI is not displaced  No murmur heard  Pulmonary/Chest: Effort normal and breath sounds normal  No accessory muscle usage  No apnea  No respiratory distress  He has no rhonchi  He has no rales  Abdominal: Soft  Normal appearance and bowel sounds are normal  He exhibits no shifting dullness  There is no hepatosplenomegaly  There is no tenderness  There is no rebound and no CVA tenderness  Hernia confirmed negative in the right inguinal area and confirmed negative in the left inguinal area  Musculoskeletal: Normal range of motion  He exhibits no edema or tenderness  Lymphadenopathy:     He has no cervical adenopathy  Neurological: He is alert  Skin: Skin is warm and intact  No rash noted  Psychiatric: He has a normal mood and affect  His speech is normal    Nursing note and vitals reviewed  Recent Results (from the past 1008 hour(s))   POCT urine dip    Collection Time: 02/06/19 10:57 AM   Result Value Ref Range    LEUKOCYTE ESTERASE,UA negative     NITRITE,UA negative     SL AMB POCT UROBILINOGEN negative     POCT URINE PROTEIN negative      PH,UA 7 0     BLOOD,UA negative     SPECIFIC GRAVITY,UA 1 010     KETONES,UA negative     BILIRUBIN,UA negative     GLUCOSE, UA negative      COLOR,UA yellow     CLARITY,UA clear    ]    No results found

## 2019-02-15 ENCOUNTER — APPOINTMENT (OUTPATIENT)
Dept: LAB | Facility: HOSPITAL | Age: 49
End: 2019-02-15
Payer: COMMERCIAL

## 2019-02-15 LAB
ALBUMIN SERPL BCP-MCNC: 3.6 G/DL (ref 3.5–5)
ALP SERPL-CCNC: 94 U/L (ref 46–116)
ALT SERPL W P-5'-P-CCNC: 18 U/L (ref 12–78)
ANION GAP SERPL CALCULATED.3IONS-SCNC: 9 MMOL/L (ref 4–13)
AST SERPL W P-5'-P-CCNC: 18 U/L (ref 5–45)
BASOPHILS # BLD AUTO: 0.04 THOUSANDS/ΜL (ref 0–0.1)
BASOPHILS NFR BLD AUTO: 1 % (ref 0–1)
BILIRUB SERPL-MCNC: 0.97 MG/DL (ref 0.2–1)
BUN SERPL-MCNC: 14 MG/DL (ref 5–25)
CALCIUM SERPL-MCNC: 8.7 MG/DL (ref 8.3–10.1)
CHLORIDE SERPL-SCNC: 105 MMOL/L (ref 100–108)
CHOLEST SERPL-MCNC: 219 MG/DL (ref 50–200)
CO2 SERPL-SCNC: 26 MMOL/L (ref 21–32)
CREAT SERPL-MCNC: 0.95 MG/DL (ref 0.6–1.3)
EOSINOPHIL # BLD AUTO: 0.17 THOUSAND/ΜL (ref 0–0.61)
EOSINOPHIL NFR BLD AUTO: 3 % (ref 0–6)
ERYTHROCYTE [DISTWIDTH] IN BLOOD BY AUTOMATED COUNT: 12.8 % (ref 11.6–15.1)
EST. AVERAGE GLUCOSE BLD GHB EST-MCNC: 117 MG/DL
GFR SERPL CREATININE-BSD FRML MDRD: 94 ML/MIN/1.73SQ M
GLUCOSE P FAST SERPL-MCNC: 90 MG/DL (ref 65–99)
HBA1C MFR BLD: 5.7 % (ref 4.2–6.3)
HCT VFR BLD AUTO: 45.9 % (ref 36.5–49.3)
HDLC SERPL-MCNC: 68 MG/DL (ref 40–60)
HGB BLD-MCNC: 14.8 G/DL (ref 12–17)
IMM GRANULOCYTES # BLD AUTO: 0.01 THOUSAND/UL (ref 0–0.2)
IMM GRANULOCYTES NFR BLD AUTO: 0 % (ref 0–2)
LDLC SERPL CALC-MCNC: 140 MG/DL (ref 0–100)
LYMPHOCYTES # BLD AUTO: 1.48 THOUSANDS/ΜL (ref 0.6–4.47)
LYMPHOCYTES NFR BLD AUTO: 30 % (ref 14–44)
MCH RBC QN AUTO: 29.6 PG (ref 26.8–34.3)
MCHC RBC AUTO-ENTMCNC: 32.2 G/DL (ref 31.4–37.4)
MCV RBC AUTO: 92 FL (ref 82–98)
MONOCYTES # BLD AUTO: 0.51 THOUSAND/ΜL (ref 0.17–1.22)
MONOCYTES NFR BLD AUTO: 10 % (ref 4–12)
NEUTROPHILS # BLD AUTO: 2.8 THOUSANDS/ΜL (ref 1.85–7.62)
NEUTS SEG NFR BLD AUTO: 56 % (ref 43–75)
NRBC BLD AUTO-RTO: 0 /100 WBCS
PLATELET # BLD AUTO: 265 THOUSANDS/UL (ref 149–390)
PMV BLD AUTO: 8.9 FL (ref 8.9–12.7)
POTASSIUM SERPL-SCNC: 3.7 MMOL/L (ref 3.5–5.3)
PROT SERPL-MCNC: 7.6 G/DL (ref 6.4–8.2)
RBC # BLD AUTO: 5 MILLION/UL (ref 3.88–5.62)
SODIUM SERPL-SCNC: 140 MMOL/L (ref 136–145)
TRIGL SERPL-MCNC: 55 MG/DL
TSH SERPL DL<=0.05 MIU/L-ACNC: 1.58 UIU/ML (ref 0.36–3.74)
WBC # BLD AUTO: 5.01 THOUSAND/UL (ref 4.31–10.16)

## 2019-02-15 PROCEDURE — 83036 HEMOGLOBIN GLYCOSYLATED A1C: CPT | Performed by: INTERNAL MEDICINE

## 2019-02-15 PROCEDURE — 84443 ASSAY THYROID STIM HORMONE: CPT | Performed by: INTERNAL MEDICINE

## 2019-02-15 PROCEDURE — 85025 COMPLETE CBC W/AUTO DIFF WBC: CPT | Performed by: INTERNAL MEDICINE

## 2019-02-15 PROCEDURE — 80061 LIPID PANEL: CPT | Performed by: INTERNAL MEDICINE

## 2019-02-15 PROCEDURE — 80053 COMPREHEN METABOLIC PANEL: CPT | Performed by: INTERNAL MEDICINE

## 2019-02-15 PROCEDURE — 36415 COLL VENOUS BLD VENIPUNCTURE: CPT | Performed by: INTERNAL MEDICINE

## 2019-02-15 PROCEDURE — 3044F HG A1C LEVEL LT 7.0%: CPT | Performed by: INTERNAL MEDICINE

## 2019-02-21 ENCOUNTER — OFFICE VISIT (OUTPATIENT)
Dept: INTERNAL MEDICINE CLINIC | Facility: CLINIC | Age: 49
End: 2019-02-21
Payer: COMMERCIAL

## 2019-02-21 ENCOUNTER — APPOINTMENT (OUTPATIENT)
Dept: LAB | Facility: HOSPITAL | Age: 49
End: 2019-02-21
Payer: COMMERCIAL

## 2019-02-21 VITALS
BODY MASS INDEX: 24.92 KG/M2 | HEART RATE: 71 BPM | RESPIRATION RATE: 16 BRPM | HEIGHT: 75 IN | DIASTOLIC BLOOD PRESSURE: 78 MMHG | TEMPERATURE: 97.8 F | WEIGHT: 200.4 LBS | SYSTOLIC BLOOD PRESSURE: 106 MMHG

## 2019-02-21 DIAGNOSIS — E78.5 HYPERLIPIDEMIA, UNSPECIFIED HYPERLIPIDEMIA TYPE: ICD-10-CM

## 2019-02-21 DIAGNOSIS — K21.9 GASTROESOPHAGEAL REFLUX DISEASE, ESOPHAGITIS PRESENCE NOT SPECIFIED: Primary | ICD-10-CM

## 2019-02-21 DIAGNOSIS — R35.0 BENIGN PROSTATIC HYPERPLASIA WITH URINARY FREQUENCY: ICD-10-CM

## 2019-02-21 DIAGNOSIS — N40.1 BENIGN PROSTATIC HYPERPLASIA WITH URINARY FREQUENCY: ICD-10-CM

## 2019-02-21 DIAGNOSIS — Z12.5 SCREENING FOR PROSTATE CANCER: ICD-10-CM

## 2019-02-21 PROCEDURE — 87338 HPYLORI STOOL AG IA: CPT | Performed by: INTERNAL MEDICINE

## 2019-02-21 PROCEDURE — 99214 OFFICE O/P EST MOD 30 MIN: CPT | Performed by: INTERNAL MEDICINE

## 2019-02-21 PROCEDURE — 3008F BODY MASS INDEX DOCD: CPT | Performed by: INTERNAL MEDICINE

## 2019-02-21 RX ORDER — NAPROXEN 500 MG/1
1 TABLET ORAL EVERY 12 HOURS
COMMUNITY
Start: 2016-01-15 | End: 2019-02-21

## 2019-02-21 RX ORDER — TAMSULOSIN HYDROCHLORIDE 0.4 MG/1
0.4 CAPSULE ORAL
Qty: 30 CAPSULE | Refills: 5 | Status: SHIPPED | OUTPATIENT
Start: 2019-02-21 | End: 2021-10-25

## 2019-02-22 LAB — H PYLORI AG STL QL IA: NEGATIVE

## 2019-02-22 NOTE — PROGRESS NOTES
Assessment/Plan:    Hyperlipidemia  Advised to change dietary habits, start a regular exercise regimen    Benign prostatic hyperplasia with urinary frequency  Start tamsulosin, screen for prostate cancer  Gastroesophageal reflux disease  Continue nexium, also discussed dietary restriction    No signs of rash or ulcers on penile area  No signs of UTI, doesn't have concerns for STD  Advised to call office if symptoms dont improve  Diagnoses and all orders for this visit:    Gastroesophageal reflux disease, esophagitis presence not specified  -     Comprehensive metabolic panel  -     Lipid Panel with Direct LDL reflex    Hyperlipidemia, unspecified hyperlipidemia type  -     Comprehensive metabolic panel  -     Lipid Panel with Direct LDL reflex    Benign prostatic hyperplasia with urinary frequency  -     tamsulosin (FLOMAX) 0 4 mg; Take 1 capsule (0 4 mg total) by mouth daily with dinner  -     PSA, Total Screen; Future    Screening for prostate cancer  -     PSA, Total Screen; Future    Other orders  -     Discontinue: naproxen (NAPROSYN) 500 mg tablet; 1 tablet Every 12 hours          Subjective:   Chief Complaint   Patient presents with    Follow-up     2 week        Patient ID: Will Grant is a 51 y o  male  He comes I for follow up of GERD, urinary frequency  Felt some irritation in penil area, wife has UTI, no urethral discharge, no rash, no dysuria himself, continues to have some nocturia and frequency  Acid reflux is well controlled      The following portions of the patient's history were reviewed and updated as appropriate: current medications, past medical history, past social history and past surgical history      PHQ-9 Depression Screening    PHQ-9:    Frequency of the following problems over the past two weeks:                Current Outpatient Medications:     acetaminophen (TYLENOL) 325 mg tablet, 650 milligrams every 6 hours as needed for pain, Disp: 30 tablet, Rfl: 0    esomeprazole (NexIUM) 20 mg capsule, Take 20 mg by mouth every morning before breakfast, Disp: , Rfl:     tamsulosin (FLOMAX) 0 4 mg, Take 1 capsule (0 4 mg total) by mouth daily with dinner, Disp: 30 capsule, Rfl: 5    Review of Systems   Constitutional: Negative for fatigue, fever and unexpected weight change  HENT: Negative for ear pain, hearing loss and sore throat  Eyes: Negative for pain and discharge  Respiratory: Negative for cough, chest tightness and shortness of breath  Cardiovascular: Negative for chest pain and palpitations  Gastrointestinal: Negative for abdominal pain, blood in stool, constipation, diarrhea and nausea  Genitourinary: Positive for frequency  Negative for dysuria and hematuria  Musculoskeletal: Negative for arthralgias and joint swelling  Skin: Negative for rash  Allergic/Immunologic: Negative for immunocompromised state  Neurological: Negative for dizziness and headaches  Hematological: Negative for adenopathy  Psychiatric/Behavioral: Negative for confusion and sleep disturbance  Objective:  /78 (BP Location: Left arm, Patient Position: Sitting, Cuff Size: Standard)   Pulse 71   Temp 97 8 °F (36 6 °C)   Resp 16   Ht 6' 3" (1 905 m)   Wt 90 9 kg (200 lb 6 4 oz)   BMI 25 05 kg/m²      Physical Exam   Constitutional: He appears well-developed and well-nourished  HENT:   Head: Normocephalic and atraumatic  Right Ear: Tympanic membrane normal    Left Ear: Tympanic membrane normal    Nose: Nose normal    Mouth/Throat: Oropharynx is clear and moist  No posterior oropharyngeal edema or posterior oropharyngeal erythema  Eyes: Pupils are equal, round, and reactive to light  Conjunctivae are normal  Right eye exhibits no discharge  Neck: Normal range of motion  Neck supple  No thyromegaly present  Cardiovascular: Normal rate, regular rhythm, S1 normal, S2 normal and normal heart sounds  PMI is not displaced  No murmur heard    Pulmonary/Chest: Effort normal and breath sounds normal  No accessory muscle usage  No apnea  No respiratory distress  He has no rhonchi  He has no rales  Abdominal: Soft  Normal appearance and bowel sounds are normal  He exhibits no shifting dullness  There is no hepatosplenomegaly  There is no tenderness  There is no rebound and no CVA tenderness  Hernia confirmed negative in the right inguinal area and confirmed negative in the left inguinal area  Genitourinary: Testes normal  Rectal exam shows external hemorrhoid  Rectal exam shows guaiac negative stool  Prostate is enlarged  Uncircumcised  Musculoskeletal: Normal range of motion  He exhibits no edema or tenderness  Lymphadenopathy:     He has no cervical adenopathy  Neurological: He is alert  Skin: Skin is warm and intact  No rash noted  Psychiatric: He has a normal mood and affect  His speech is normal    Nursing note and vitals reviewed          Recent Results (from the past 1008 hour(s))   POCT urine dip    Collection Time: 02/06/19 10:57 AM   Result Value Ref Range    LEUKOCYTE ESTERASE,UA negative     NITRITE,UA negative     SL AMB POCT UROBILINOGEN negative     POCT URINE PROTEIN negative      PH,UA 7 0     BLOOD,UA negative     SPECIFIC GRAVITY,UA 1 010     KETONES,UA negative     BILIRUBIN,UA negative     GLUCOSE, UA negative      COLOR,UA yellow     CLARITY,UA clear    CBC and differential    Collection Time: 02/15/19 11:46 AM   Result Value Ref Range    WBC 5 01 4 31 - 10 16 Thousand/uL    RBC 5 00 3 88 - 5 62 Million/uL    Hemoglobin 14 8 12 0 - 17 0 g/dL    Hematocrit 45 9 36 5 - 49 3 %    MCV 92 82 - 98 fL    MCH 29 6 26 8 - 34 3 pg    MCHC 32 2 31 4 - 37 4 g/dL    RDW 12 8 11 6 - 15 1 %    MPV 8 9 8 9 - 12 7 fL    Platelets 082 072 - 720 Thousands/uL    nRBC 0 /100 WBCs    Neutrophils Relative 56 43 - 75 %    Immat GRANS % 0 0 - 2 %    Lymphocytes Relative 30 14 - 44 %    Monocytes Relative 10 4 - 12 %    Eosinophils Relative 3 0 - 6 %    Basophils Relative 1 0 - 1 %    Neutrophils Absolute 2 80 1 85 - 7 62 Thousands/µL    Immature Grans Absolute 0 01 0 00 - 0 20 Thousand/uL    Lymphocytes Absolute 1 48 0 60 - 4 47 Thousands/µL    Monocytes Absolute 0 51 0 17 - 1 22 Thousand/µL    Eosinophils Absolute 0 17 0 00 - 0 61 Thousand/µL    Basophils Absolute 0 04 0 00 - 0 10 Thousands/µL   Comprehensive metabolic panel    Collection Time: 02/15/19 11:46 AM   Result Value Ref Range    Sodium 140 136 - 145 mmol/L    Potassium 3 7 3 5 - 5 3 mmol/L    Chloride 105 100 - 108 mmol/L    CO2 26 21 - 32 mmol/L    ANION GAP 9 4 - 13 mmol/L    BUN 14 5 - 25 mg/dL    Creatinine 0 95 0 60 - 1 30 mg/dL    Glucose, Fasting 90 65 - 99 mg/dL    Calcium 8 7 8 3 - 10 1 mg/dL    AST 18 5 - 45 U/L    ALT 18 12 - 78 U/L    Alkaline Phosphatase 94 46 - 116 U/L    Total Protein 7 6 6 4 - 8 2 g/dL    Albumin 3 6 3 5 - 5 0 g/dL    Total Bilirubin 0 97 0 20 - 1 00 mg/dL    eGFR 94 ml/min/1 73sq m   Hemoglobin A1C    Collection Time: 02/15/19 11:46 AM   Result Value Ref Range    Hemoglobin A1C 5 7 4 2 - 6 3 %     mg/dl   Lipid Panel with Direct LDL reflex    Collection Time: 02/15/19 11:46 AM   Result Value Ref Range    Cholesterol 219 (H) 50 - 200 mg/dL    Triglycerides 55 <=150 mg/dL    HDL, Direct 68 (H) 40 - 60 mg/dL    LDL Calculated 140 (H) 0 - 100 mg/dL   TSH, 3rd generation with Free T4 reflex    Collection Time: 02/15/19 11:46 AM   Result Value Ref Range    TSH 3RD GENERATON 1 576 0 358 - 3 740 uIU/mL   ]    No results found  BMI Counseling: Body mass index is 25 05 kg/m²  Discussed the patient's BMI with him  The BMI is above average  BMI counseling and education was provided to the patient  Nutrition recommendations include 3-5 servings of fruits/vegetables daily, reducing fast food intake and decreasing soda and/or juice intake  Exercise recommendations include exercising 3-5 times per week

## 2019-05-03 ENCOUNTER — APPOINTMENT (OUTPATIENT)
Dept: LAB | Facility: HOSPITAL | Age: 49
End: 2019-05-03
Payer: COMMERCIAL

## 2019-05-03 ENCOUNTER — OFFICE VISIT (OUTPATIENT)
Dept: INTERNAL MEDICINE CLINIC | Facility: CLINIC | Age: 49
End: 2019-05-03
Payer: COMMERCIAL

## 2019-05-03 ENCOUNTER — HOSPITAL ENCOUNTER (OUTPATIENT)
Dept: CT IMAGING | Facility: HOSPITAL | Age: 49
Discharge: HOME | End: 2019-05-03
Payer: COMMERCIAL

## 2019-05-03 VITALS
WEIGHT: 209.2 LBS | BODY MASS INDEX: 26.01 KG/M2 | HEART RATE: 65 BPM | TEMPERATURE: 98.2 F | DIASTOLIC BLOOD PRESSURE: 76 MMHG | SYSTOLIC BLOOD PRESSURE: 120 MMHG | RESPIRATION RATE: 12 BRPM | HEIGHT: 75 IN | OXYGEN SATURATION: 98 %

## 2019-05-03 DIAGNOSIS — R31.9 HEMATURIA, UNSPECIFIED TYPE: Primary | ICD-10-CM

## 2019-05-03 DIAGNOSIS — R31.9 HEMATURIA, UNSPECIFIED TYPE: ICD-10-CM

## 2019-05-03 DIAGNOSIS — Z12.5 SCREENING FOR PROSTATE CANCER: ICD-10-CM

## 2019-05-03 DIAGNOSIS — R35.0 BENIGN PROSTATIC HYPERPLASIA WITH URINARY FREQUENCY: ICD-10-CM

## 2019-05-03 DIAGNOSIS — N40.1 BENIGN PROSTATIC HYPERPLASIA WITH URINARY FREQUENCY: ICD-10-CM

## 2019-05-03 LAB
ALBUMIN SERPL BCP-MCNC: 4.3 G/DL (ref 3–5.2)
ALP SERPL-CCNC: 83 U/L (ref 43–122)
ALT SERPL W P-5'-P-CCNC: 12 U/L (ref 9–52)
ANION GAP SERPL CALCULATED.3IONS-SCNC: 8 MMOL/L (ref 5–14)
AST SERPL W P-5'-P-CCNC: 21 U/L (ref 17–59)
BASOPHILS # BLD AUTO: 0 THOUSANDS/ΜL (ref 0–0.1)
BASOPHILS NFR BLD AUTO: 1 % (ref 0–1)
BILIRUB SERPL-MCNC: 0.8 MG/DL
BILIRUB UR QL STRIP: NEGATIVE
BUN SERPL-MCNC: 12 MG/DL (ref 5–25)
CALCIUM SERPL-MCNC: 9.2 MG/DL (ref 8.4–10.2)
CHLORIDE SERPL-SCNC: 101 MMOL/L (ref 97–108)
CHOLEST SERPL-MCNC: 221 MG/DL
CLARITY UR: CLEAR
CO2 SERPL-SCNC: 29 MMOL/L (ref 22–30)
COLOR UR: NORMAL
CREAT SERPL-MCNC: 0.91 MG/DL (ref 0.7–1.5)
EOSINOPHIL # BLD AUTO: 0.2 THOUSAND/ΜL (ref 0–0.4)
EOSINOPHIL NFR BLD AUTO: 4 % (ref 0–6)
ERYTHROCYTE [DISTWIDTH] IN BLOOD BY AUTOMATED COUNT: 13.2 %
GFR SERPL CREATININE-BSD FRML MDRD: 99 ML/MIN/1.73SQ M
GLUCOSE SERPL-MCNC: 92 MG/DL (ref 70–99)
GLUCOSE UR STRIP-MCNC: NEGATIVE MG/DL
HCT VFR BLD AUTO: 44.2 % (ref 41–53)
HDLC SERPL-MCNC: 64 MG/DL (ref 40–59)
HGB BLD-MCNC: 14.5 G/DL (ref 13.5–17.5)
HGB UR QL STRIP.AUTO: NEGATIVE
KETONES UR STRIP-MCNC: NEGATIVE MG/DL
LDLC SERPL CALC-MCNC: 138 MG/DL
LEUKOCYTE ESTERASE UR QL STRIP: NEGATIVE
LYMPHOCYTES # BLD AUTO: 1.6 THOUSANDS/ΜL (ref 0.5–4)
LYMPHOCYTES NFR BLD AUTO: 30 % (ref 25–45)
MCH RBC QN AUTO: 30.3 PG (ref 26–34)
MCHC RBC AUTO-ENTMCNC: 32.9 G/DL (ref 31–36)
MCV RBC AUTO: 92 FL (ref 80–100)
MONOCYTES # BLD AUTO: 0.5 THOUSAND/ΜL (ref 0.2–0.9)
MONOCYTES NFR BLD AUTO: 10 % (ref 1–10)
NEUTROPHILS # BLD AUTO: 3 THOUSANDS/ΜL (ref 1.8–7.8)
NEUTS SEG NFR BLD AUTO: 56 % (ref 45–65)
NITRITE UR QL STRIP: NEGATIVE
PH UR STRIP.AUTO: 7 [PH]
PLATELET # BLD AUTO: 260 THOUSANDS/UL (ref 150–450)
PMV BLD AUTO: 7.6 FL (ref 8.9–12.7)
POTASSIUM SERPL-SCNC: 4.8 MMOL/L (ref 3.6–5)
PROT SERPL-MCNC: 7.6 G/DL (ref 5.9–8.4)
PROT UR STRIP-MCNC: NEGATIVE MG/DL
PSA SERPL-MCNC: 0.7 NG/ML (ref 0–4)
RBC # BLD AUTO: 4.8 MILLION/UL (ref 4.5–5.9)
SL AMB  POCT GLUCOSE, UA: ABNORMAL
SL AMB LEUKOCYTE ESTERASE,UA: ABNORMAL
SL AMB POCT BILIRUBIN,UA: ABNORMAL
SL AMB POCT BLOOD,UA: ABNORMAL
SL AMB POCT CLARITY,UA: CLEAR
SL AMB POCT COLOR,UA: YELLOW
SL AMB POCT KETONES,UA: ABNORMAL
SL AMB POCT NITRITE,UA: ABNORMAL
SL AMB POCT PH,UA: 6
SL AMB POCT SPECIFIC GRAVITY,UA: 1.01
SL AMB POCT URINE PROTEIN: ABNORMAL
SL AMB POCT UROBILINOGEN: 0.2
SODIUM SERPL-SCNC: 138 MMOL/L (ref 137–147)
SP GR UR STRIP.AUTO: 1.01 (ref 1–1.04)
TRIGL SERPL-MCNC: 93 MG/DL
UROBILINOGEN UA: NEGATIVE MG/DL
WBC # BLD AUTO: 5.5 THOUSAND/UL (ref 4.5–11)

## 2019-05-03 PROCEDURE — 85025 COMPLETE CBC W/AUTO DIFF WBC: CPT | Performed by: INTERNAL MEDICINE

## 2019-05-03 PROCEDURE — 36415 COLL VENOUS BLD VENIPUNCTURE: CPT | Performed by: INTERNAL MEDICINE

## 2019-05-03 PROCEDURE — 99214 OFFICE O/P EST MOD 30 MIN: CPT | Performed by: INTERNAL MEDICINE

## 2019-05-03 PROCEDURE — 80053 COMPREHEN METABOLIC PANEL: CPT | Performed by: INTERNAL MEDICINE

## 2019-05-03 PROCEDURE — G0103 PSA SCREENING: HCPCS

## 2019-05-03 PROCEDURE — 74176 CT ABD & PELVIS W/O CONTRAST: CPT

## 2019-05-03 PROCEDURE — 3008F BODY MASS INDEX DOCD: CPT | Performed by: INTERNAL MEDICINE

## 2019-05-03 PROCEDURE — 80061 LIPID PANEL: CPT | Performed by: INTERNAL MEDICINE

## 2019-05-03 PROCEDURE — 81002 URINALYSIS NONAUTO W/O SCOPE: CPT | Performed by: INTERNAL MEDICINE

## 2019-05-03 PROCEDURE — 81003 URINALYSIS AUTO W/O SCOPE: CPT | Performed by: INTERNAL MEDICINE

## 2019-05-03 RX ORDER — CIPROFLOXACIN 500 MG/1
500 TABLET, FILM COATED ORAL EVERY 12 HOURS SCHEDULED
Qty: 14 TABLET | Refills: 0 | Status: SHIPPED | OUTPATIENT
Start: 2019-05-03 | End: 2019-05-10

## 2019-05-06 ENCOUNTER — TELEPHONE (OUTPATIENT)
Dept: INTERNAL MEDICINE CLINIC | Facility: CLINIC | Age: 49
End: 2019-05-06

## 2020-04-18 ENCOUNTER — HOSPITAL ENCOUNTER (EMERGENCY)
Facility: HOSPITAL | Age: 50
Discharge: HOME | End: 2020-04-18
Attending: EMERGENCY MEDICINE | Admitting: EMERGENCY MEDICINE
Payer: COMMERCIAL

## 2020-04-18 ENCOUNTER — APPOINTMENT (EMERGENCY)
Dept: RADIOLOGY | Facility: HOSPITAL | Age: 50
End: 2020-04-18
Payer: COMMERCIAL

## 2020-04-18 VITALS
WEIGHT: 214.07 LBS | BODY MASS INDEX: 26.76 KG/M2 | SYSTOLIC BLOOD PRESSURE: 124 MMHG | HEART RATE: 72 BPM | DIASTOLIC BLOOD PRESSURE: 75 MMHG | TEMPERATURE: 96.8 F | RESPIRATION RATE: 18 BRPM | OXYGEN SATURATION: 98 %

## 2020-04-18 DIAGNOSIS — S61.219A FINGER LACERATION: Primary | ICD-10-CM

## 2020-04-18 PROCEDURE — 99283 EMERGENCY DEPT VISIT LOW MDM: CPT

## 2020-04-18 PROCEDURE — 90715 TDAP VACCINE 7 YRS/> IM: CPT | Performed by: PHYSICIAN ASSISTANT

## 2020-04-18 PROCEDURE — 90471 IMMUNIZATION ADMIN: CPT

## 2020-04-18 PROCEDURE — 73140 X-RAY EXAM OF FINGER(S): CPT

## 2020-04-18 PROCEDURE — 99284 EMERGENCY DEPT VISIT MOD MDM: CPT | Performed by: PHYSICIAN ASSISTANT

## 2020-04-18 PROCEDURE — 12001 RPR S/N/AX/GEN/TRNK 2.5CM/<: CPT | Performed by: PHYSICIAN ASSISTANT

## 2020-04-18 RX ADMIN — TETANUS TOXOID, REDUCED DIPHTHERIA TOXOID AND ACELLULAR PERTUSSIS VACCINE, ADSORBED 0.5 ML: 5; 2.5; 8; 8; 2.5 SUSPENSION INTRAMUSCULAR at 21:14

## 2020-04-20 ENCOUNTER — TELEPHONE (OUTPATIENT)
Dept: OTHER | Facility: HOSPITAL | Age: 50
End: 2020-04-20

## 2020-06-25 ENCOUNTER — HOSPITAL ENCOUNTER (EMERGENCY)
Facility: HOSPITAL | Age: 50
Discharge: HOME | End: 2020-06-25
Attending: EMERGENCY MEDICINE | Admitting: EMERGENCY MEDICINE
Payer: COMMERCIAL

## 2020-06-25 ENCOUNTER — APPOINTMENT (EMERGENCY)
Dept: RADIOLOGY | Facility: HOSPITAL | Age: 50
End: 2020-06-25
Payer: COMMERCIAL

## 2020-06-25 VITALS
WEIGHT: 214 LBS | RESPIRATION RATE: 18 BRPM | DIASTOLIC BLOOD PRESSURE: 86 MMHG | HEART RATE: 59 BPM | SYSTOLIC BLOOD PRESSURE: 125 MMHG | TEMPERATURE: 97.8 F | BODY MASS INDEX: 26.75 KG/M2 | OXYGEN SATURATION: 99 %

## 2020-06-25 DIAGNOSIS — R07.89 ATYPICAL CHEST PAIN: Primary | ICD-10-CM

## 2020-06-25 LAB
ANION GAP SERPL CALCULATED.3IONS-SCNC: 8 MMOL/L (ref 4–13)
BASOPHILS # BLD AUTO: 0.03 THOUSANDS/ΜL (ref 0–0.1)
BASOPHILS NFR BLD AUTO: 1 % (ref 0–1)
BUN SERPL-MCNC: 11 MG/DL (ref 5–25)
CALCIUM SERPL-MCNC: 8.6 MG/DL (ref 8.3–10.1)
CHLORIDE SERPL-SCNC: 104 MMOL/L (ref 100–108)
CO2 SERPL-SCNC: 27 MMOL/L (ref 21–32)
CREAT SERPL-MCNC: 1.07 MG/DL (ref 0.6–1.3)
EOSINOPHIL # BLD AUTO: 0.21 THOUSAND/ΜL (ref 0–0.61)
EOSINOPHIL NFR BLD AUTO: 4 % (ref 0–6)
ERYTHROCYTE [DISTWIDTH] IN BLOOD BY AUTOMATED COUNT: 12.6 % (ref 11.6–15.1)
GFR SERPL CREATININE-BSD FRML MDRD: 81 ML/MIN/1.73SQ M
GLUCOSE SERPL-MCNC: 96 MG/DL (ref 65–140)
HCT VFR BLD AUTO: 43.1 % (ref 36.5–49.3)
HGB BLD-MCNC: 14.1 G/DL (ref 12–17)
IMM GRANULOCYTES # BLD AUTO: 0.02 THOUSAND/UL (ref 0–0.2)
IMM GRANULOCYTES NFR BLD AUTO: 0 % (ref 0–2)
LYMPHOCYTES # BLD AUTO: 1.29 THOUSANDS/ΜL (ref 0.6–4.47)
LYMPHOCYTES NFR BLD AUTO: 24 % (ref 14–44)
MCH RBC QN AUTO: 30.1 PG (ref 26.8–34.3)
MCHC RBC AUTO-ENTMCNC: 32.7 G/DL (ref 31.4–37.4)
MCV RBC AUTO: 92 FL (ref 82–98)
MONOCYTES # BLD AUTO: 0.52 THOUSAND/ΜL (ref 0.17–1.22)
MONOCYTES NFR BLD AUTO: 10 % (ref 4–12)
NEUTROPHILS # BLD AUTO: 3.36 THOUSANDS/ΜL (ref 1.85–7.62)
NEUTS SEG NFR BLD AUTO: 61 % (ref 43–75)
NRBC BLD AUTO-RTO: 0 /100 WBCS
PLATELET # BLD AUTO: 267 THOUSANDS/UL (ref 149–390)
PMV BLD AUTO: 8.9 FL (ref 8.9–12.7)
POTASSIUM SERPL-SCNC: 3.9 MMOL/L (ref 3.5–5.3)
RBC # BLD AUTO: 4.68 MILLION/UL (ref 3.88–5.62)
SODIUM SERPL-SCNC: 139 MMOL/L (ref 136–145)
TROPONIN I SERPL-MCNC: <0.02 NG/ML
WBC # BLD AUTO: 5.43 THOUSAND/UL (ref 4.31–10.16)

## 2020-06-25 PROCEDURE — 85025 COMPLETE CBC W/AUTO DIFF WBC: CPT | Performed by: EMERGENCY MEDICINE

## 2020-06-25 PROCEDURE — 99285 EMERGENCY DEPT VISIT HI MDM: CPT | Performed by: EMERGENCY MEDICINE

## 2020-06-25 PROCEDURE — 84484 ASSAY OF TROPONIN QUANT: CPT | Performed by: EMERGENCY MEDICINE

## 2020-06-25 PROCEDURE — 99285 EMERGENCY DEPT VISIT HI MDM: CPT

## 2020-06-25 PROCEDURE — 93005 ELECTROCARDIOGRAM TRACING: CPT

## 2020-06-25 PROCEDURE — 36415 COLL VENOUS BLD VENIPUNCTURE: CPT | Performed by: EMERGENCY MEDICINE

## 2020-06-25 PROCEDURE — 80048 BASIC METABOLIC PNL TOTAL CA: CPT | Performed by: EMERGENCY MEDICINE

## 2020-06-25 PROCEDURE — 71046 X-RAY EXAM CHEST 2 VIEWS: CPT

## 2020-06-25 RX ORDER — SODIUM CHLORIDE 9 MG/ML
3 INJECTION INTRAVENOUS
Status: DISCONTINUED | OUTPATIENT
Start: 2020-06-25 | End: 2020-06-25 | Stop reason: HOSPADM

## 2020-06-26 LAB
ATRIAL RATE: 70 BPM
P AXIS: 67 DEGREES
PR INTERVAL: 126 MS
QRS AXIS: -16 DEGREES
QRSD INTERVAL: 90 MS
QT INTERVAL: 390 MS
QTC INTERVAL: 412 MS
T WAVE AXIS: 58 DEGREES
VENTRICULAR RATE: 67 BPM

## 2020-06-26 PROCEDURE — 93010 ELECTROCARDIOGRAM REPORT: CPT | Performed by: INTERNAL MEDICINE

## 2020-06-29 ENCOUNTER — TELEPHONE (OUTPATIENT)
Dept: INTERNAL MEDICINE CLINIC | Facility: CLINIC | Age: 50
End: 2020-06-29

## 2020-11-30 ENCOUNTER — TELEPHONE (OUTPATIENT)
Dept: INTERNAL MEDICINE CLINIC | Facility: CLINIC | Age: 50
End: 2020-11-30

## 2021-05-19 ENCOUNTER — APPOINTMENT (OUTPATIENT)
Dept: URGENT CARE | Age: 51
End: 2021-05-19
Payer: OTHER MISCELLANEOUS

## 2021-05-19 PROCEDURE — 99283 EMERGENCY DEPT VISIT LOW MDM: CPT | Performed by: PHYSICIAN ASSISTANT

## 2021-05-19 PROCEDURE — G0382 LEV 3 HOSP TYPE B ED VISIT: HCPCS | Performed by: PHYSICIAN ASSISTANT

## 2021-05-21 ENCOUNTER — APPOINTMENT (OUTPATIENT)
Dept: URGENT CARE | Age: 51
End: 2021-05-21
Payer: OTHER MISCELLANEOUS

## 2021-05-21 PROCEDURE — 99213 OFFICE O/P EST LOW 20 MIN: CPT | Performed by: PHYSICIAN ASSISTANT

## 2021-09-24 DIAGNOSIS — U07.1 COVID-19: Primary | ICD-10-CM

## 2021-09-25 PROCEDURE — U0005 INFEC AGEN DETEC AMPLI PROBE: HCPCS | Performed by: INTERNAL MEDICINE

## 2021-09-25 PROCEDURE — U0003 INFECTIOUS AGENT DETECTION BY NUCLEIC ACID (DNA OR RNA); SEVERE ACUTE RESPIRATORY SYNDROME CORONAVIRUS 2 (SARS-COV-2) (CORONAVIRUS DISEASE [COVID-19]), AMPLIFIED PROBE TECHNIQUE, MAKING USE OF HIGH THROUGHPUT TECHNOLOGIES AS DESCRIBED BY CMS-2020-01-R: HCPCS | Performed by: INTERNAL MEDICINE

## 2021-10-25 ENCOUNTER — OFFICE VISIT (OUTPATIENT)
Dept: INTERNAL MEDICINE CLINIC | Facility: CLINIC | Age: 51
End: 2021-10-25
Payer: COMMERCIAL

## 2021-10-25 VITALS
OXYGEN SATURATION: 98 % | RESPIRATION RATE: 18 BRPM | WEIGHT: 202 LBS | HEIGHT: 75 IN | SYSTOLIC BLOOD PRESSURE: 112 MMHG | DIASTOLIC BLOOD PRESSURE: 80 MMHG | HEART RATE: 70 BPM | BODY MASS INDEX: 25.12 KG/M2 | TEMPERATURE: 97.6 F

## 2021-10-25 DIAGNOSIS — E78.5 HYPERLIPIDEMIA, UNSPECIFIED HYPERLIPIDEMIA TYPE: ICD-10-CM

## 2021-10-25 DIAGNOSIS — K21.9 GASTROESOPHAGEAL REFLUX DISEASE, UNSPECIFIED WHETHER ESOPHAGITIS PRESENT: ICD-10-CM

## 2021-10-25 DIAGNOSIS — Z23 ENCOUNTER FOR IMMUNIZATION: ICD-10-CM

## 2021-10-25 DIAGNOSIS — M67.911 TENDINOPATHY OF RIGHT ROTATOR CUFF: Primary | ICD-10-CM

## 2021-10-25 DIAGNOSIS — Z00.00 ANNUAL PHYSICAL EXAM: ICD-10-CM

## 2021-10-25 DIAGNOSIS — Z12.11 SCREENING FOR COLON CANCER: ICD-10-CM

## 2021-10-25 DIAGNOSIS — H69.82 DYSFUNCTION OF LEFT EUSTACHIAN TUBE: ICD-10-CM

## 2021-10-25 DIAGNOSIS — Z12.5 SCREENING FOR PROSTATE CANCER: ICD-10-CM

## 2021-10-25 PROCEDURE — 99396 PREV VISIT EST AGE 40-64: CPT | Performed by: INTERNAL MEDICINE

## 2021-10-25 PROCEDURE — 90686 IIV4 VACC NO PRSV 0.5 ML IM: CPT | Performed by: INTERNAL MEDICINE

## 2021-10-25 PROCEDURE — 90471 IMMUNIZATION ADMIN: CPT | Performed by: INTERNAL MEDICINE

## 2021-10-25 PROCEDURE — 3008F BODY MASS INDEX DOCD: CPT | Performed by: INTERNAL MEDICINE

## 2021-10-25 PROCEDURE — 3725F SCREEN DEPRESSION PERFORMED: CPT | Performed by: INTERNAL MEDICINE

## 2021-10-25 RX ORDER — FLUTICASONE PROPIONATE 50 MCG
1 SPRAY, SUSPENSION (ML) NASAL DAILY
Qty: 15.8 ML | Refills: 0 | Status: SHIPPED | OUTPATIENT
Start: 2021-10-25 | End: 2022-03-23

## 2022-01-03 ENCOUNTER — TELEPHONE (OUTPATIENT)
Dept: INTERNAL MEDICINE CLINIC | Facility: CLINIC | Age: 52
End: 2022-01-03

## 2022-01-03 DIAGNOSIS — Z20.822 COVID-19 RULED OUT: Primary | ICD-10-CM

## 2022-01-03 PROCEDURE — 87636 SARSCOV2 & INF A&B AMP PRB: CPT | Performed by: INTERNAL MEDICINE

## 2022-01-07 ENCOUNTER — TELEMEDICINE (OUTPATIENT)
Dept: INTERNAL MEDICINE CLINIC | Facility: CLINIC | Age: 52
End: 2022-01-07
Payer: COMMERCIAL

## 2022-01-07 DIAGNOSIS — U07.1 COVID-19: Primary | ICD-10-CM

## 2022-01-07 PROCEDURE — 99213 OFFICE O/P EST LOW 20 MIN: CPT | Performed by: INTERNAL MEDICINE

## 2022-01-07 NOTE — PROGRESS NOTES
COVID-19 Outpatient Progress Note    Assessment/Plan:    He looks well appearing on today's visit  His symptoms are improving  We discussed quarantine guidelines  He requests work note  He is okay to return to work on Monday pending ongoing improvement in his condition  Continue with symptomatic management    Problem List Items Addressed This Visit     None      Visit Diagnoses     COVID-19    -  Primary         Mitchel is seen today for COVID visit  He has a medical history of right rotator cuff tendinopathy, hyperlipidemia, BPH, GERD  Onset of symptoms was this prior Sunday night into Monday morning 1/3  Headache, runny nose, lower body myalgias, fevers, diaphoresis, dry cough, sore throat  He is improving  He completed primary vaccination series with [employer] back in April and May  His wife also has similar symptoms  He has been using Tylenol, Mucinex, drinking plenty of tea and water      COVID-19 Plan   Encounter provider Hernesto Moyer DO    Provider located at [place] [phone]    Recent Visits  Date Type Provider Dept   01/03/22 Telephone Hernesto Moyer DO  [facility]   Showing recent visits within past 7 days and meeting all other requirements  Today's Visits  Date Type Provider Dept   01/07/22 Telemedicine Hernesto Moyer DO  [facility]   Showing today's visits and meeting all other requirements  Future Appointments  No visits were found meeting these conditions  Showing future appointments within next 150 days and meeting all other requirements     COVID-19 HPI  Lab Results   Component Value Date    SARSCOV2 Positive (A) 01/03/2022    SARSCORONAVI DETECTED ([facility]) 11/16/2020     No past medical history on file    Past Surgical History:   Procedure Laterality Date    INCISION AND DRAINAGE OF WOUND N/A 9/18/2018    Procedure: INCISION AND DRAINAGE (I&D) SCROTUM; Surgeon: Kaur Gaspar MD;  Location: [facility];  Service: Urology     Current Outpatient Medications   Medication Sig Dispense Refill    esomeprazole (NexIUM) 20 mg capsule Take 20 mg by mouth every morning before breakfast      fluticasone (FLONASE) 50 mcg/act nasal spray 1 spray into each nostril daily 15 8 mL 0    acetaminophen (TYLENOL) 325 mg tablet 650 milligrams every 6 hours as needed for pain (Patient not taking: Reported on 6/25/2020) 30 tablet 0     No current facility-administered medications for this visit  Allergies   Allergen Reactions    No Known Allergies        Review of Systems  Objective: There were no vitals filed for this visit  Physical Exam    VIRTUAL VISIT DISCLAIMER    Mitchel Mckeon verbally agrees to participate in [employer]  Pt is aware that [employer] could be limited without vital signs or the ability to perform a full hands-on physical Libia Flair understands he or the provider may request at any time to terminate the video visit and request the patient to seek care or treatment in person

## 2022-01-07 NOTE — LETTER
January 7, 2022     Patient: Jami Mercer   YOB: 1970   Date of Visit: 1/7/2022       To Whom it May Concern:    Jami Mercer is under my professional care  He was seen via virtual visit on 01/07/2022  He was recently diagnosed with COVID and remains in quarantine  He is okay to return to the office on Monday, 01/10/2022, pending improvement in his condition  If you have any questions or concerns, please don't hesitate to call           Sincerely,          Hernesto Vargas DO        CC: No Recipients

## 2022-03-23 ENCOUNTER — OFFICE VISIT (OUTPATIENT)
Dept: INTERNAL MEDICINE CLINIC | Facility: CLINIC | Age: 52
End: 2022-03-23
Payer: COMMERCIAL

## 2022-03-23 VITALS
TEMPERATURE: 97.7 F | OXYGEN SATURATION: 98 % | WEIGHT: 207 LBS | HEIGHT: 75 IN | RESPIRATION RATE: 18 BRPM | BODY MASS INDEX: 25.74 KG/M2 | SYSTOLIC BLOOD PRESSURE: 110 MMHG | DIASTOLIC BLOOD PRESSURE: 72 MMHG | HEART RATE: 71 BPM

## 2022-03-23 DIAGNOSIS — M25.551 LATERAL PAIN OF RIGHT HIP: ICD-10-CM

## 2022-03-23 DIAGNOSIS — H69.82 DYSFUNCTION OF LEFT EUSTACHIAN TUBE: ICD-10-CM

## 2022-03-23 DIAGNOSIS — Z11.59 ENCOUNTER FOR HEPATITIS C SCREENING TEST FOR LOW RISK PATIENT: ICD-10-CM

## 2022-03-23 DIAGNOSIS — Z80.0 FAMILY HISTORY OF COLON CANCER: ICD-10-CM

## 2022-03-23 DIAGNOSIS — E78.5 HYPERLIPIDEMIA, UNSPECIFIED HYPERLIPIDEMIA TYPE: Primary | ICD-10-CM

## 2022-03-23 DIAGNOSIS — M67.911 TENDINOPATHY OF RIGHT ROTATOR CUFF: ICD-10-CM

## 2022-03-23 DIAGNOSIS — K21.9 GASTROESOPHAGEAL REFLUX DISEASE, UNSPECIFIED WHETHER ESOPHAGITIS PRESENT: ICD-10-CM

## 2022-03-23 DIAGNOSIS — Z11.4 SCREENING FOR HIV (HUMAN IMMUNODEFICIENCY VIRUS): ICD-10-CM

## 2022-03-23 DIAGNOSIS — Z12.5 SCREENING FOR PROSTATE CANCER: ICD-10-CM

## 2022-03-23 PROCEDURE — 99214 OFFICE O/P EST MOD 30 MIN: CPT | Performed by: INTERNAL MEDICINE

## 2022-03-23 PROCEDURE — 3008F BODY MASS INDEX DOCD: CPT | Performed by: INTERNAL MEDICINE

## 2022-03-23 NOTE — PROGRESS NOTES
INTERNAL MEDICINE OFFICE VISIT  [facility] Internal Medicine- [place]    NAME: Rebecca Davis  AGE: 45 y o  SEX: male    DATE OF ENCOUNTER: 3/23/2022    Assessment and Plan     1  Hyperlipidemia, unspecified hyperlipidemia type  - CBC and differential; Future  - Comprehensive metabolic panel; Future  - Hemoglobin A1C; Future  - Lipid panel; Future    2  Family history of colon cancer  -  Family history of colon cancer noted in his mother  She was diagnosed in her early 76s  He is due for colon cancer screening  I will reorder colonoscopy referral, again encouraged him to get screening done given his family history  - Ambulatory referral for colonoscopy; Future    3  Gastroesophageal reflux disease, unspecified whether esophagitis present  -well controlled at this time  He has not taken Nexium for number of months    4  Screening for HIV (human immunodeficiency virus)    - HIV 1/2 Antigen/Antibody (4th Generation) w Reflex [facility]; Future    5  Encounter for hepatitis C screening test for low risk patient    - Hepatitis C antibody; Future    6  Screening for prostate cancer  - PSA, Total Screen; Future    7  Tendinopathy of right rotator cuff  -at our prior appointment symptoms were concerning for chronic tendinopathy of the right rotator cuff and possible underlying arthritis  -He had MRI of the right shoulder done in 2010 which showed mild grade 1 AC joint separation, mild ligamentous sprain of rotator interval, supraspinatus tendinopathy without tear  -this is overall much improved  Continue to monitor  No need for further imaging at this time    8  Dysfunction of left eustachian tube  -resolved, no longer taking Flonase    9  Lateral pain of right hip  -right lateral hip pain  He does not endorse any numbness, tingling, muscle weakness, loss of sensation    He suspects this may be from doing heavy lifting and bending at work, he lifts over 50 lb at times  -possible muscle strain/spasm or greater trochanteric pain syndrome  -recommend conservative management                Orders Placed This Encounter   Procedures    CBC and differential    Comprehensive metabolic panel    Hemoglobin A1C    Lipid panel    PSA, Total Screen    HIV 1/2 Antigen/Antibody (4th Generation) w Reflex [facility]    Hepatitis C antibody    Ambulatory referral for colonoscopy       Chief Complaint     Chief Complaint   Patient presents with    Follow-up     colonoscopy        History of Present Illness     Patient comes in today for follow-up    The following portions of the patient's history were reviewed and updated as appropriate: allergies, current medications, past family history, past medical history, past social history, past surgical history and problem list     Review of Systems     10 point ROS negative except per HPI    Active Problem List     Patient Active Problem List   Diagnosis    Gastroesophageal reflux disease    Benign prostatic hyperplasia with urinary frequency    Hyperlipidemia       Objective     /72 (BP Location: Left arm, Patient Position: Sitting, Cuff Size: Standard)   Pulse 71   Temp 97 7 °F (36 5 °C)   Resp 18   Ht 6' 3" (1 905 m)   Wt 93 9 kg (207 lb)   SpO2 98%   BMI 25 87 kg/m²     Physical Exam  Constitutional:       Appearance: Normal appearance  He is not ill-appearing  HENT:      Head: Normocephalic and atraumatic  Eyes:      General: No scleral icterus  Right eye: No discharge  Left eye: No discharge  Cardiovascular:      Rate and Rhythm: Normal rate and regular rhythm  Heart sounds: No murmur heard  No friction rub  Pulmonary:      Effort: Pulmonary effort is normal       Breath sounds: Normal breath sounds  No wheezing or rales  Abdominal:      General: Abdomen is flat  There is no distension  Palpations: Abdomen is soft  Tenderness: There is no abdominal tenderness  Musculoskeletal:         General: No swelling or tenderness     Skin:     General: Skin is warm and dry  Findings: No erythema  Neurological:      Mental Status: He is alert  Mental status is at baseline  Motor: No weakness  Psychiatric:         Mood and Affect: Mood normal          Behavior: Behavior normal          Pertinent Laboratory/Diagnostic Studies:  X-ray chest 2 views    Result Date: 6/25/2020  Impression: No acute cardiopulmonary disease  Workstation performed: [id number]       Images and diagnostics reviewed     Current Medications     Current Outpatient Medications:     esomeprazole (NexIUM) 20 mg capsule, Take 20 mg by mouth daily as needed , Disp: , Rfl:     Health Maintenance     Health Maintenance   Topic Date Due    Hepatitis C Screening  Never done    HIV Screening  Never done    Colorectal Cancer Screening  Never done    COVID-19 Vaccine (3 - Booster for Asif Naegeli series) 06/23/2022 (Originally 10/21/2021)    Depression Screening  10/25/2022    BMI: Followup Plan  10/25/2022    Annual Physical  10/25/2022    BMI: Adult  03/23/2023    DTaP,Tdap,and Td Vaccines (2 - Td or Tdap) 04/18/2030    Influenza Vaccine  Completed    Pneumococcal Vaccine: Pediatrics (0 to 5 Years) and At-Risk Patients (6 to 59 Years)  Aged Out    HIB Vaccine  Aged Out    Hepatitis B Vaccine  Aged Out    IPV Vaccine  Aged Out    Hepatitis A Vaccine  Aged Out    Meningococcal ACWY Vaccine  Aged Out    HPV Vaccine  Aged [employer] History   Administered Date(s) Administered    COVID-19 MODERNA VACC 0 5 ML IM 04/23/2021, 05/21/2021    INFLUENZA 11/03/2015, 11/03/2015, 11/02/2017, 11/02/2017    Influenza, injectable, quadrivalent, preservative free 0 5 mL 10/25/2021    Tdap 04/18/2020       INGE Skinner  Internal Medicine [address]  [place], [address] [facility]  Office: [phone]

## 2022-04-20 ENCOUNTER — PREP FOR PROCEDURE (OUTPATIENT)
Dept: GASTROENTEROLOGY | Facility: MEDICAL CENTER | Age: 52
End: 2022-04-20

## 2022-04-20 ENCOUNTER — OFFICE VISIT (OUTPATIENT)
Dept: INTERNAL MEDICINE CLINIC | Facility: CLINIC | Age: 52
End: 2022-04-20
Payer: COMMERCIAL

## 2022-04-20 ENCOUNTER — APPOINTMENT (OUTPATIENT)
Dept: LAB | Facility: HOSPITAL | Age: 52
End: 2022-04-20
Attending: INTERNAL MEDICINE
Payer: COMMERCIAL

## 2022-04-20 ENCOUNTER — TELEPHONE (OUTPATIENT)
Dept: GASTROENTEROLOGY | Facility: MEDICAL CENTER | Age: 52
End: 2022-04-20

## 2022-04-20 VITALS
RESPIRATION RATE: 18 BRPM | BODY MASS INDEX: 25.74 KG/M2 | WEIGHT: 207 LBS | SYSTOLIC BLOOD PRESSURE: 100 MMHG | HEIGHT: 75 IN | DIASTOLIC BLOOD PRESSURE: 68 MMHG | OXYGEN SATURATION: 99 % | TEMPERATURE: 97.6 F | HEART RATE: 72 BPM

## 2022-04-20 DIAGNOSIS — Z11.4 SCREENING FOR HIV (HUMAN IMMUNODEFICIENCY VIRUS): ICD-10-CM

## 2022-04-20 DIAGNOSIS — Z12.11 SCREENING FOR COLON CANCER: Primary | ICD-10-CM

## 2022-04-20 DIAGNOSIS — Z11.59 ENCOUNTER FOR HEPATITIS C SCREENING TEST FOR LOW RISK PATIENT: ICD-10-CM

## 2022-04-20 DIAGNOSIS — Z12.5 SCREENING FOR PROSTATE CANCER: ICD-10-CM

## 2022-04-20 DIAGNOSIS — E78.5 HYPERLIPIDEMIA, UNSPECIFIED HYPERLIPIDEMIA TYPE: ICD-10-CM

## 2022-04-20 DIAGNOSIS — M54.50 ACUTE RIGHT-SIDED LOW BACK PAIN WITHOUT SCIATICA: Primary | ICD-10-CM

## 2022-04-20 LAB
ALBUMIN SERPL BCP-MCNC: 3.6 G/DL (ref 3.5–5)
ALP SERPL-CCNC: 85 U/L (ref 46–116)
ALT SERPL W P-5'-P-CCNC: 22 U/L (ref 12–78)
ANION GAP SERPL CALCULATED.3IONS-SCNC: 8 MMOL/L (ref 4–13)
AST SERPL W P-5'-P-CCNC: 14 U/L (ref 5–45)
BASOPHILS # BLD AUTO: 0.03 THOUSANDS/ΜL (ref 0–0.1)
BASOPHILS NFR BLD AUTO: 1 % (ref 0–1)
BILIRUB SERPL-MCNC: 0.65 MG/DL (ref 0.2–1)
BUN SERPL-MCNC: 16 MG/DL (ref 5–25)
CALCIUM SERPL-MCNC: 8.7 MG/DL (ref 8.3–10.1)
CHLORIDE SERPL-SCNC: 107 MMOL/L (ref 100–108)
CHOLEST SERPL-MCNC: 214 MG/DL
CO2 SERPL-SCNC: 28 MMOL/L (ref 21–32)
CREAT SERPL-MCNC: 1.02 MG/DL (ref 0.6–1.3)
EOSINOPHIL # BLD AUTO: 0.13 THOUSAND/ΜL (ref 0–0.61)
EOSINOPHIL NFR BLD AUTO: 2 % (ref 0–6)
ERYTHROCYTE [DISTWIDTH] IN BLOOD BY AUTOMATED COUNT: 12.7 % (ref 11.6–15.1)
EST. AVERAGE GLUCOSE BLD GHB EST-MCNC: 105 MG/DL
GFR SERPL CREATININE-BSD FRML MDRD: 84 ML/MIN/1.73SQ M
GLUCOSE P FAST SERPL-MCNC: 88 MG/DL (ref 65–99)
HBA1C MFR BLD: 5.3 %
HCT VFR BLD AUTO: 45.8 % (ref 36.5–49.3)
HCV AB SER QL: NORMAL
HDLC SERPL-MCNC: 64 MG/DL
HGB BLD-MCNC: 14.7 G/DL (ref 12–17)
IMM GRANULOCYTES # BLD AUTO: 0.02 THOUSAND/UL (ref 0–0.2)
IMM GRANULOCYTES NFR BLD AUTO: 0 % (ref 0–2)
LDLC SERPL CALC-MCNC: 133 MG/DL (ref 0–100)
LYMPHOCYTES # BLD AUTO: 1.86 THOUSANDS/ΜL (ref 0.6–4.47)
LYMPHOCYTES NFR BLD AUTO: 34 % (ref 14–44)
MCH RBC QN AUTO: 29.7 PG (ref 26.8–34.3)
MCHC RBC AUTO-ENTMCNC: 32.1 G/DL (ref 31.4–37.4)
MCV RBC AUTO: 93 FL (ref 82–98)
MONOCYTES # BLD AUTO: 0.58 THOUSAND/ΜL (ref 0.17–1.22)
MONOCYTES NFR BLD AUTO: 11 % (ref 4–12)
NEUTROPHILS # BLD AUTO: 2.84 THOUSANDS/ΜL (ref 1.85–7.62)
NEUTS SEG NFR BLD AUTO: 52 % (ref 43–75)
NONHDLC SERPL-MCNC: 150 MG/DL
NRBC BLD AUTO-RTO: 0 /100 WBCS
PLATELET # BLD AUTO: 262 THOUSANDS/UL (ref 149–390)
PMV BLD AUTO: 9.2 FL (ref 8.9–12.7)
POTASSIUM SERPL-SCNC: 4.5 MMOL/L (ref 3.5–5.3)
PROT SERPL-MCNC: 7.6 G/DL (ref 6.4–8.2)
PSA SERPL-MCNC: 0.8 NG/ML (ref 0–4)
RBC # BLD AUTO: 4.95 MILLION/UL (ref 3.88–5.62)
SODIUM SERPL-SCNC: 143 MMOL/L (ref 136–145)
TRIGL SERPL-MCNC: 87 MG/DL
WBC # BLD AUTO: 5.46 THOUSAND/UL (ref 4.31–10.16)

## 2022-04-20 PROCEDURE — 86803 HEPATITIS C AB TEST: CPT

## 2022-04-20 PROCEDURE — 80061 LIPID PANEL: CPT

## 2022-04-20 PROCEDURE — 36415 COLL VENOUS BLD VENIPUNCTURE: CPT

## 2022-04-20 PROCEDURE — G0103 PSA SCREENING: HCPCS

## 2022-04-20 PROCEDURE — 99214 OFFICE O/P EST MOD 30 MIN: CPT | Performed by: INTERNAL MEDICINE

## 2022-04-20 PROCEDURE — 83036 HEMOGLOBIN GLYCOSYLATED A1C: CPT

## 2022-04-20 PROCEDURE — 87389 HIV-1 AG W/HIV-1&-2 AB AG IA: CPT

## 2022-04-20 PROCEDURE — 85025 COMPLETE CBC W/AUTO DIFF WBC: CPT

## 2022-04-20 PROCEDURE — 80053 COMPREHEN METABOLIC PANEL: CPT

## 2022-04-20 PROCEDURE — 3008F BODY MASS INDEX DOCD: CPT | Performed by: INTERNAL MEDICINE

## 2022-04-20 RX ORDER — NAPROXEN 500 MG/1
500 TABLET ORAL 2 TIMES DAILY PRN
Qty: 28 TABLET | Refills: 0 | Status: SHIPPED | OUTPATIENT
Start: 2022-04-20 | End: 2022-05-04

## 2022-04-20 RX ORDER — METHOCARBAMOL 500 MG/1
500 TABLET, FILM COATED ORAL 4 TIMES DAILY PRN
Qty: 20 TABLET | Refills: 0 | Status: SHIPPED | OUTPATIENT
Start: 2022-04-20

## 2022-04-20 NOTE — PROGRESS NOTES
INTERNAL MEDICINE OFFICE VISIT  [facility] Internal Medicine- [place]    NAME: Sd Wakeman  AGE: 45 y o  SEX: male    DATE OF ENCOUNTER: 4/20/2022    Assessment and Plan     1  Acute right-sided low back pain without sciatica  -acute onset right low back pain without red flags on exam today  -symptomatic treatment with short course of naproxen, muscle relaxant  Also counseled on trialing ice versus heat, topical analgesics, activity as tolerated  -provided with work note  Can return to work this Monday provided his condition improves    - naproxen (Naprosyn) 500 mg tablet; Take 1 tablet (500 mg total) by mouth 2 (two) times a day as needed for moderate pain for up to 14 days  Dispense: 28 tablet; Refill: 0  - methocarbamol (ROBAXIN) 500 mg tablet; Take 1 tablet (500 mg total) by mouth 4 (four) times a day as needed for muscle spasms  Dispense: 20 tablet; Refill: 0          No orders of the defined types were placed in this encounter  Chief Complaint     Chief Complaint   Patient presents with    Back Pain       History of Present Illness     Mitchel comes in today for evaluation of acute onset right low back pain    Symptoms started weekend before last   He had been moving sheet rock and gardening at home  Had gradual worsening of symptoms  The following Thursday at work he felt a sharp pain when bending down to  an object  He took off Friday through Monday to recuperate  He has had some residual pain in the right lumbar area  This is nonradiating  He denies any paresthesias, loss of sensation, muscle weakness, bowel or bladder incontinence, fevers, chills    Has been taking ibuprofen 600 mg as needed in addition to CBD cream which has helped    He participated in physical therapy last year for a similar problem    The following portions of the patient's history were reviewed and updated as appropriate: allergies, current medications, past family history, past medical history, past social history, past surgical history and problem list     Review of Systems     10 point ROS negative except per HPI    Active Problem List     Patient Active Problem List   Diagnosis    Gastroesophageal reflux disease    Benign prostatic hyperplasia with urinary frequency    Hyperlipidemia       Objective     /68 (BP Location: Left arm, Patient Position: Sitting, Cuff Size: Standard)   Pulse 72   Temp 97 6 °F (36 4 °C)   Resp 18   Ht 6' 3" (1 905 m)   Wt 93 9 kg (207 lb)   SpO2 99%   BMI 25 87 kg/m²     Physical Exam  Constitutional:       Appearance: Normal appearance  He is not ill-appearing  HENT:      Head: Normocephalic and atraumatic  Eyes:      General: No scleral icterus  Right eye: No discharge  Left eye: No discharge  Cardiovascular:      Rate and Rhythm: Normal rate and regular rhythm  Heart sounds: No murmur heard  No friction rub  Pulmonary:      Effort: Pulmonary effort is normal       Breath sounds: Normal breath sounds  No wheezing or rales  Abdominal:      General: Abdomen is flat  There is no distension  Palpations: Abdomen is soft  Tenderness: There is no abdominal tenderness  Musculoskeletal:         General: Tenderness (Tenderness to palpation right paraspinal lumbar area) present  No swelling  Skin:     General: Skin is warm and dry  Findings: No erythema  Neurological:      Mental Status: He is alert  Mental status is at baseline  Motor: No weakness  Psychiatric:         Mood and Affect: Mood normal          Behavior: Behavior normal          Pertinent Laboratory/Diagnostic Studies:  X-ray chest 2 views    Result Date: 6/25/2020  Impression: No acute cardiopulmonary disease   Workstation performed: [id number]       Images and diagnostics reviewed     Current Medications     Current Outpatient Medications:     esomeprazole (NexIUM) 20 mg capsule, Take 20 mg by mouth daily as needed , Disp: , Rfl:     methocarbamol (ROBAXIN) 500 mg tablet, Take 1 tablet (500 mg total) by mouth 4 (four) times a day as needed for muscle spasms, Disp: 20 tablet, Rfl: 0    naproxen (Naprosyn) 500 mg tablet, Take 1 tablet (500 mg total) by mouth 2 (two) times a day as needed for moderate pain for up to 14 days, Disp: 28 tablet, Rfl: 0    Health Maintenance     Health Maintenance   Topic Date Due    Hepatitis C Screening  Never done    HIV Screening  Never done    Colorectal Cancer Screening  Never done    COVID-19 Vaccine (3 - Booster for Elisa Gutierrez series) 06/23/2022 (Originally 10/21/2021)    Depression Screening  10/25/2022    BMI: Followup Plan  10/25/2022    Annual Physical  10/25/2022    BMI: Adult  04/20/2023    DTaP,Tdap,and Td Vaccines (2 - Td or Tdap) 04/18/2030    Influenza Vaccine  Completed    Pneumococcal Vaccine: Pediatrics (0 to 5 Years) and At-Risk Patients (6 to 59 Years)  Aged Out    HIB Vaccine  Aged Out    Hepatitis B Vaccine  Aged Out    IPV Vaccine  Aged Out    Hepatitis A Vaccine  Aged Out    Meningococcal ACWY Vaccine  Aged Out    HPV Vaccine  Aged [employer] History   Administered Date(s) Administered    COVID-19 MODERNA VACC 0 5 ML IM 04/23/2021, 05/21/2021    INFLUENZA 11/03/2015, 11/03/2015, 11/02/2017, 11/02/2017    Influenza, injectable, quadrivalent, preservative free 0 5 mL 10/25/2021    Tdap 04/18/2020       Max Monna Phalen, D O Tavcarjeva  Internal Medicine [place] [address] [address], [facility] [address]  [facility], [address]   Office: [phone]

## 2022-04-20 NOTE — TELEPHONE ENCOUNTER
04/20/22  Screened by: Killian Andrade    Referring Provider Dr Madhavi Patel    Pre- Screening: There is no height or weight on file to calculate BMI  25 87  Has patient been referred for a routine screening Colonoscopy? yes  Is the patient between 39-70 years old? no      Previous Colonoscopy no   If yes:    Date: n/a    Facility: n/a    Reason: n/a      SCHEDULING STAFF: If the patient is between 45yrs-49yrs, please advise patient to confirm benefits/coverage with their insurance company for a routine screening colonoscopy, some insurance carriers will only cover at [facility] or [facility]  If the patient is over 66years old, please schedule an office visit  Does the patient want to see a Gastroenterologist prior to their procedure OR are they having any GI symptoms? no    Has the patient been hospitalized or had abdominal surgery in the past 6 months? no    Does the patient use supplemental oxygen? no    Does the patient take Coumadin, Lovenox, Plavix, Elliquis, Xarelto, or other blood thinning medication? no    Has the patient had a stroke, cardiac event, or stent placed in the past year? no    SCHEDULING STAFF: If patient answers NO to above questions, then schedule procedure  If patient answers YES to above questions, then schedule office appointment  If patient is between 45yrs - 49yrs, please advise patient that we will have to confirm benefits & coverage with their insurance company for a routine screening colonoscopy

## 2022-04-20 NOTE — LETTER
April 20, 2022     Patient: Jewels Duran  YOB: 1970  Date of Visit: 4/20/2022      To Whom it May Concern:    Jewels Duran is under my professional care  Ernestine Shalini was seen in my office on 4/20/2022  He is currently dealing with a back injury and continues to recover  Please excuse him from work for the remainder of this week  He is okay to return to work on Monday 04/25/2022 provided his condition improves      If you have any questions or concerns, please don't hesitate to call           Sincerely,          Hernesto House, DO        CC: No Recipients

## 2022-04-20 NOTE — TELEPHONE ENCOUNTER
Called patient to go over oa options - pt selected a colon, passed oa    Scheduled date of colonoscopy (as of today): 6-30-22  Physician performing colonoscopy: Dr Caio Mosquera  Location of colonoscopy: [facility]  Bowel prep reviewed with patient: miralax mag citrate   Instructions reviewed with patient by: mailed to pt by office staff  Clearances: n/a

## 2022-04-21 LAB — HIV 1+2 AB+HIV1 P24 AG SERPL QL IA: NORMAL

## 2022-04-25 ENCOUNTER — TELEPHONE (OUTPATIENT)
Dept: INTERNAL MEDICINE CLINIC | Facility: CLINIC | Age: 52
End: 2022-04-25

## 2022-04-25 NOTE — TELEPHONE ENCOUNTER
----- [facility] from [facility] sent at 4/24/2022  8:40 PM EDT -----  No major concerns with blood work  Testing for HIV and hep C is negative  Kidney function, electrolytes, complete blood count all within normal limits    LDL mildly elevated at 133, HDL good at 64

## 2022-06-29 RX ORDER — SODIUM CHLORIDE 9 MG/ML
125 INJECTION, SOLUTION INTRAVENOUS CONTINUOUS
Status: CANCELLED | OUTPATIENT
Start: 2022-06-29

## 2022-09-23 ENCOUNTER — OFFICE VISIT (OUTPATIENT)
Dept: INTERNAL MEDICINE CLINIC | Facility: CLINIC | Age: 52
End: 2022-09-23
Payer: COMMERCIAL

## 2022-09-23 VITALS
TEMPERATURE: 97.3 F | RESPIRATION RATE: 18 BRPM | OXYGEN SATURATION: 97 % | WEIGHT: 206.6 LBS | HEIGHT: 75 IN | DIASTOLIC BLOOD PRESSURE: 78 MMHG | SYSTOLIC BLOOD PRESSURE: 116 MMHG | BODY MASS INDEX: 25.69 KG/M2 | HEART RATE: 84 BPM

## 2022-09-23 DIAGNOSIS — E78.5 HYPERLIPIDEMIA, UNSPECIFIED HYPERLIPIDEMIA TYPE: ICD-10-CM

## 2022-09-23 DIAGNOSIS — L23.7 POISON IVY DERMATITIS: ICD-10-CM

## 2022-09-23 DIAGNOSIS — Z12.5 SCREENING FOR PROSTATE CANCER: Primary | ICD-10-CM

## 2022-09-23 DIAGNOSIS — Z23 ENCOUNTER FOR IMMUNIZATION: ICD-10-CM

## 2022-09-23 DIAGNOSIS — Z12.11 SCREENING FOR COLORECTAL CANCER: ICD-10-CM

## 2022-09-23 DIAGNOSIS — Z12.12 SCREENING FOR COLORECTAL CANCER: ICD-10-CM

## 2022-09-23 PROCEDURE — 90471 IMMUNIZATION ADMIN: CPT | Performed by: INTERNAL MEDICINE

## 2022-09-23 PROCEDURE — 90682 RIV4 VACC RECOMBINANT DNA IM: CPT | Performed by: INTERNAL MEDICINE

## 2022-09-23 PROCEDURE — 99214 OFFICE O/P EST MOD 30 MIN: CPT | Performed by: INTERNAL MEDICINE

## 2022-09-23 NOTE — PROGRESS NOTES
INTERNAL MEDICINE OFFICE VISIT  [facility] Internal Medicine- [place]    NAME: Gregory Khan  AGE: 47 y o  SEX: male    DATE OF ENCOUNTER: 9/23/2022    Assessment and Plan/History of Present Illness     1  Screening for prostate cancer  - PSA, Total Screen; Future    2  Hyperlipidemia, unspecified hyperlipidemia type  -, HDL 64, TG 87 on most recent lipid panel April 2022  -ASCVD risk score of 3 4%, no indication for statin at this time  -healthy dietary and lifestyle choices encouraged    - CBC and differential; Future  - Comprehensive metabolic panel; Future  - Lipid panel; Future    3  Screening for colorectal cancer  -previously referred for colonoscopy, patient ended up cancelling his last procedure  Will refer again for colonoscopy, patient is agreeable    - Ambulatory Referral to General Surgery; Future    4  Encounter for immunization  - FLUBLOK: influenza vaccine, quadrivalent, recombinant, PF, 0 5 mL    5  Poison ivy dermatitis  -seems to be resolving, rash of the bilateral arms, chest, Abdomen  -may continue with calamine lotion as needed or alternatives such as Burow's solution         Here today for follow-up  He does not have any major concerns today  Recently seen at [facility] for poison ivy dermatitis    Patient states he was replacing windows in his basement, went outside and removed some branches by a pine tree and thinks he was exposed at this time          Orders Placed This Encounter   Procedures    FLUBLOK: influenza vaccine, quadrivalent, recombinant, PF, 0 5 mL    CBC and differential    Comprehensive metabolic panel    Lipid panel    PSA, Total Screen    Ambulatory Referral to General Surgery       Chief Complaint     Chief Complaint   Patient presents with    Follow-up     Poison ivy all over        Review of Systems     10 point ROS negative except per HPI    The following portions of the patient's history were reviewed and updated as appropriate: allergies, current medications, past family history, past medical history, past social history, past surgical history and problem list     Active Problem List     Patient Active Problem List   Diagnosis    Gastroesophageal reflux disease    Benign prostatic hyperplasia with urinary frequency    Hyperlipidemia       Objective     /78 (BP Location: Left arm, Patient Position: Sitting, Cuff Size: Standard)   Pulse 84   Temp (!) 97 3 °F (36 3 °C)   Resp 18   Ht 6' 3" (1 905 m)   Wt 93 7 kg (206 lb 9 6 oz)   SpO2 97%   BMI 25 82 kg/m²     Physical Exam  Constitutional:       Appearance: Normal appearance  He is not ill-appearing  HENT:      Head: Normocephalic and atraumatic  Eyes:      General: No scleral icterus  Right eye: No discharge  Left eye: No discharge  Cardiovascular:      Rate and Rhythm: Normal rate and regular rhythm  Heart sounds: No murmur heard  No friction rub  Pulmonary:      Effort: Pulmonary effort is normal       Breath sounds: Normal breath sounds  No wheezing or rales  Abdominal:      General: Abdomen is flat  There is no distension  Palpations: Abdomen is soft  Tenderness: There is no abdominal tenderness  Musculoskeletal:         General: No swelling or tenderness  Skin:     General: Skin is warm and dry  Findings: No erythema  Neurological:      Mental Status: He is alert  Mental status is at baseline  Motor: No weakness  Psychiatric:         Mood and Affect: Mood normal          Behavior: Behavior normal          Pertinent Laboratory/Diagnostic Studies:  X-ray chest 2 views    Result Date: 6/25/2020  Impression: No acute cardiopulmonary disease   Workstation performed: [id number]       Images and diagnostics reviewed     Current Medications     Current Outpatient Medications:     naproxen (Naprosyn) 500 mg tablet, Take 1 tablet (500 mg total) by mouth 2 (two) times a day as needed for moderate pain for up to 14 days, Disp: 28 tablet, Rfl: 0    Health Maintenance     Health Maintenance   Topic Date Due    Colorectal Cancer Screening  Never done    COVID-19 Vaccine (3 - Booster for Moderna series) 10/21/2021    Depression Screening  10/25/2022    BMI: Followup Plan  10/25/2022    Annual Physical  10/25/2022    BMI: Adult  09/23/2023    DTaP,Tdap,and Td Vaccines (2 - Td or Tdap) 04/18/2030    HIV Screening  Completed    Hepatitis C Screening  Completed    Influenza Vaccine  Completed    Pneumococcal Vaccine: Pediatrics (0 to 5 Years) and At-Risk Patients (6 to 59 Years)  Aged Out    HIB Vaccine  Aged Out    Hepatitis B Vaccine  Aged Out    IPV Vaccine  Aged Out    Hepatitis A Vaccine  Aged Out    Meningococcal ACWY Vaccine  Aged Out    HPV Vaccine  Aged [employer] History   Administered Date(s) Administered    COVID-19 MODERNA VACC 0 5 ML IM 04/23/2021, 05/21/2021    INFLUENZA 11/03/2015, 11/03/2015, 11/02/2017, 11/02/2017, 09/22/2020    Influenza, injectable, quadrivalent, preservative free 0 5 mL 10/25/2021    Influenza, recombinant, quadrivalent,injectable, preservative free 09/23/2022    Tdap 04/18/2020       INGE Goyal  Internal Medicine [place] [address] [address], [facility] [address]  [place], [address] [facility]  Office: [phone]  Fax: [phone]

## 2024-01-15 ENCOUNTER — TELEPHONE (OUTPATIENT)
Age: 54
End: 2024-01-15

## 2024-01-15 NOTE — TELEPHONE ENCOUNTER
Scheduled date of colonoscopy (as of today): 3/4/24  Physician performing colonoscopy: PEDRO  [facility] of colonoscopy[facility]  Bowel prep reviewed with patient: FRANTZ / KHALIF  Instructions reviewed with patient by: EMAIL  Clearances: N/A

## 2024-02-16 ENCOUNTER — TELEPHONE (OUTPATIENT)
Dept: GASTROENTEROLOGY | Facility: CLINIC | Age: 54
End: 2024-02-16

## 2024-02-19 ENCOUNTER — ANESTHESIA (OUTPATIENT)
Dept: ANESTHESIOLOGY | Facility: HOSPITAL | Age: 54
End: 2024-02-19

## 2024-02-19 ENCOUNTER — ANESTHESIA EVENT (OUTPATIENT)
Dept: ANESTHESIOLOGY | Facility: HOSPITAL | Age: 54
End: 2024-02-19

## 2024-02-21 NOTE — TELEPHONE ENCOUNTER
Patient confirm, all question answer. Re-send prep because patient speak english and he prefer to have it in english.

## 2024-03-01 RX ORDER — SODIUM CHLORIDE 9 MG/ML
125 INJECTION, SOLUTION INTRAVENOUS CONTINUOUS
OUTPATIENT
Start: 2024-03-01

## 2024-03-04 ENCOUNTER — ANESTHESIA EVENT (OUTPATIENT)
Dept: GASTROENTEROLOGY | Facility: MEDICAL CENTER | Age: 54
End: 2024-03-04

## 2024-03-04 ENCOUNTER — HOSPITAL ENCOUNTER (OUTPATIENT)
Dept: GASTROENTEROLOGY | Facility: MEDICAL CENTER | Age: 54
Setting detail: OUTPATIENT SURGERY
Discharge: HOME | End: 2024-03-04
Payer: COMMERCIAL

## 2024-03-04 ENCOUNTER — ANESTHESIA (OUTPATIENT)
Dept: GASTROENTEROLOGY | Facility: MEDICAL CENTER | Age: 54
End: 2024-03-04

## 2024-03-04 VITALS
SYSTOLIC BLOOD PRESSURE: 137 MMHG | HEART RATE: 60 BPM | OXYGEN SATURATION: 99 % | DIASTOLIC BLOOD PRESSURE: 86 MMHG | RESPIRATION RATE: 20 BRPM | TEMPERATURE: 97.8 F

## 2024-03-04 DIAGNOSIS — Z12.11 SCREENING FOR COLON CANCER: ICD-10-CM

## 2024-03-04 PROCEDURE — 45385 COLONOSCOPY W/LESION REMOVAL: CPT | Performed by: INTERNAL MEDICINE

## 2024-03-04 PROCEDURE — 45380 COLONOSCOPY AND BIOPSY: CPT | Performed by: INTERNAL MEDICINE

## 2024-03-04 PROCEDURE — 45381 COLONOSCOPY SUBMUCOUS NJX: CPT | Performed by: INTERNAL MEDICINE

## 2024-03-04 PROCEDURE — 88305 TISSUE EXAM BY PATHOLOGIST: CPT | Performed by: STUDENT IN AN ORGANIZED HEALTH CARE EDUCATION/TRAINING PROGRAM

## 2024-03-04 RX ORDER — PROPOFOL 10 MG/ML
INJECTION, EMULSION INTRAVENOUS AS NEEDED
Status: DISCONTINUED | OUTPATIENT
Start: 2024-03-04 | End: 2024-03-04

## 2024-03-04 RX ORDER — LIDOCAINE HYDROCHLORIDE 20 MG/ML
INJECTION, SOLUTION EPIDURAL; INFILTRATION; INTRACAUDAL; PERINEURAL AS NEEDED
Status: DISCONTINUED | OUTPATIENT
Start: 2024-03-04 | End: 2024-03-04

## 2024-03-04 RX ORDER — SODIUM CHLORIDE 9 MG/ML
125 INJECTION, SOLUTION INTRAVENOUS CONTINUOUS
Status: DISCONTINUED | OUTPATIENT
Start: 2024-03-04 | End: 2024-03-08 | Stop reason: HOSPADM

## 2024-03-04 RX ADMIN — PROPOFOL 50 MG: 10 INJECTION, EMULSION INTRAVENOUS at 09:26

## 2024-03-04 RX ADMIN — PROPOFOL 50 MG: 10 INJECTION, EMULSION INTRAVENOUS at 09:16

## 2024-03-04 RX ADMIN — PROPOFOL 50 MG: 10 INJECTION, EMULSION INTRAVENOUS at 09:10

## 2024-03-04 RX ADMIN — LIDOCAINE HYDROCHLORIDE 60 MG: 20 INJECTION, SOLUTION EPIDURAL; INFILTRATION; INTRACAUDAL at 09:06

## 2024-03-04 RX ADMIN — PROPOFOL 50 MG: 10 INJECTION, EMULSION INTRAVENOUS at 09:19

## 2024-03-04 RX ADMIN — PROPOFOL 50 MG: 10 INJECTION, EMULSION INTRAVENOUS at 09:08

## 2024-03-04 RX ADMIN — PROPOFOL 50 MG: 10 INJECTION, EMULSION INTRAVENOUS at 09:41

## 2024-03-04 RX ADMIN — PROPOFOL 100 MG: 10 INJECTION, EMULSION INTRAVENOUS at 09:06

## 2024-03-04 RX ADMIN — PROPOFOL 50 MG: 10 INJECTION, EMULSION INTRAVENOUS at 09:12

## 2024-03-04 RX ADMIN — PROPOFOL 50 MG: 10 INJECTION, EMULSION INTRAVENOUS at 09:23

## 2024-03-04 RX ADMIN — SODIUM CHLORIDE 125 ML/HR: 0.9 INJECTION, SOLUTION INTRAVENOUS at 09:00

## 2024-03-04 RX ADMIN — PROPOFOL 50 MG: 10 INJECTION, EMULSION INTRAVENOUS at 09:35

## 2024-03-04 RX ADMIN — PROPOFOL 50 MG: 10 INJECTION, EMULSION INTRAVENOUS at 09:30

## 2024-03-04 NOTE — ANESTHESIA PREPROCEDURE EVALUATION
Procedure:  COLONOSCOPY    Relevant Problems   ANESTHESIA (within normal limits)      CARDIO   (+) Hyperlipidemia      GI/HEPATIC   (+) Gastroesophageal reflux disease      Other   (+) Benign prostatic hyperplasia with urinary frequency        Physical Exam    Airway    Mallampati score: II  TM Distance: >3 FB  Neck ROM: full     Dental       Cardiovascular  Rhythm: regular, Rate: normal    Pulmonary   Breath sounds clear to auscultation    Other Findings        Anesthesia Plan  ASA Score- 2     Anesthesia Type- IV sedation with anesthesia with ASA Monitors.         Additional Monitors:     Airway Plan:            Plan Factors-Exercise tolerance (METS): >4 METS.    Chart reviewed.   Existing labs reviewed. Patient summary reviewed.    Patient is not a current smoker.              Induction- intravenous.    Postoperative Plan-     Informed Consent- Anesthetic plan and risks discussed with patient.

## 2024-03-04 NOTE — DISCHARGE INSTRUCTIONS
Several metal clips were placed in your colon to control bleeding. These clips will come out of your body in your bowel movement within the next few weeks. If you need an MRI in the next month, please show the MRI tech the card we give you with the clip information.

## 2024-03-04 NOTE — ANESTHESIA POSTPROCEDURE EVALUATION
Post-Op Assessment Note    CV Status:  Stable    Pain management: adequate       Mental Status:  Alert and awake   Hydration Status:  Euvolemic   PONV Controlled:  Controlled   Airway Patency:  Patent     Post Op Vitals Reviewed: Yes    No anethesia notable event occurred.    Staff: Anesthesiologist               /71 (03/04/24 0949)    Temp      Pulse 68 (03/04/24 0949)   Resp 17 (03/04/24 0949)    SpO2 98 % (03/04/24 0949)

## 2024-03-04 NOTE — H&P
History and Physical - [facility]  Mitchel Mckeon 53 y.o. male MRN: [id number]          HPI: Mitchel Mckeon is a 53 y.o. year old male who presents for   Colon cancer screening.      REVIEW OF SYSTEMS: Per the HPI, and otherwise unremarkable.    Historical Information   History reviewed. No pertinent past medical history.  Past Surgical History:   Procedure Laterality Date    INCISION AND DRAINAGE OF WOUND N/A 9/18/2018    Procedure: INCISION AND DRAINAGE (I&D) SCROTUM;  Surgeon: Norberto Bettencourt MD;  Location: [facility];  Service: Urology     Social History   Social History     Substance and Sexual Activity   Alcohol Use Yes    Comment: social     Social History     Substance and Sexual Activity   Drug Use No     Social History     Tobacco Use   Smoking Status Never   Smokeless Tobacco Never     Family History   Problem Relation Age of Onset    Colon cancer Mother        Meds/Allergies     (Not in a hospital admission)      Allergies   Allergen Reactions    No Known Allergies        Objective     There were no vitals taken for this visit.      PHYSICAL EXAMINATION:    General Appearance:   Alert, cooperative, no distress   HEENT:  Normocephalic, atraumatic, anicteric. Neck supple, symmetrical, trachea midline.   Lungs:   Equal chest rise and unlabored breathing, normal effort, no coughing.   Cardiovascular:   No visualized JVD.   Abdomen:   No abdominal distension.   Skin:   No jaundice, rashes, or lesions.    Musculoskeletal:   Normal range of motion visualized.   Psych:  Normal affect and normal insight.   Neuro:  Alert and appropriate.           ASSESSMENT/PLAN:  This is a 53 y.o. year old male here for colonoscopy, and he is stable and optimized for his procedure.

## 2024-03-06 PROCEDURE — 88305 TISSUE EXAM BY PATHOLOGIST: CPT | Performed by: STUDENT IN AN ORGANIZED HEALTH CARE EDUCATION/TRAINING PROGRAM

## 2025-06-30 ENCOUNTER — CONSULT (OUTPATIENT)
Dept: PLASTIC SURGERY | Facility: CLINIC | Age: 55
End: 2025-06-30

## 2025-06-30 VITALS
SYSTOLIC BLOOD PRESSURE: 119 MMHG | HEART RATE: 77 BPM | TEMPERATURE: 98.1 F | WEIGHT: 199 LBS | HEIGHT: 75 IN | BODY MASS INDEX: 24.74 KG/M2 | DIASTOLIC BLOOD PRESSURE: 85 MMHG

## 2025-06-30 DIAGNOSIS — L65.9 ALOPECIA: Primary | ICD-10-CM

## 2025-06-30 NOTE — PROGRESS NOTES
"[facility]  [address], [address], [place] [address]  [phone]    Patient Identification: Mitchel Mckeon is a 54 y.o. male     History of Present Illness: The patient is a 54 y.o. male who presents to the office for a new consultation for \"scar on head\". It turns out that the area of concern is a big, round patch of hair loss, and patient wants to discuss options for hair re-growth. Patient states that about 15 years ago, he had a cut to the area that got infected. He was placed on some medication that he cannot recall the name of, that resulted in this patchy area of hair loss. Area has remained the same throughout the years. He saw a dermatologist for the matter who stated that there was nothing that could be done from their standpoint. This is a self-referral.           The following portions of the patient's history were reviewed: allergies, current medications, past medical history, past surgical history, and past social history    Past Medical History[1]     Past Surgical History[2]    Allergies   Allergen Reactions    No Known Allergies         Current Medications[3]      Social History     Socioeconomic History    Marital status: /Civil Union     Spouse name: Not on file    Number of children: Not on file    Years of education: Not on file    Highest education level: Not on file   Occupational History    Not on file   Tobacco Use    Smoking status: Never    Smokeless tobacco: Never   Substance and Sexual Activity    Alcohol use: Not Currently     Comment: social    Drug use: No    Sexual activity: Yes     Partners: Female   Other Topics Concern    Not on file   Social History Narrative    Not on file     Social Drivers of Health     Financial Resource Strain: Not on file   Food Insecurity: Not on file   Transportation Needs: Not on file   Physical Activity: Not on file   Stress: Not on file   Social Connections: Not on file   Intimate Partner " "Violence: Not on file   Housing Stability: Not on file      Vitals:    06/30/25 0909   BP: 119/85   Pulse: 77   Temp: 98.1 °F (36.7 °C)        Physical Exam    General: Pleasant and well appearing, in no acute distress.  Neurological: A&O x 3, CN 2-12 grossly intact.   HEENT: normocephalic, atraumatic. PERRLA, EOMI. External ears normal, no lesions or deformities. No visible or palpable masses, depressions, or scarring. Neck supple, trachea midline.   Cardiac: External chest normal in appearance without lifts, heaves, or thrills. RRR  Respiratory: no signs of respiratory distress. Normal respiratory and breathing effort.   Abdominal: abdomen soft, non-tender, without distension.   Extremities: normal alignment, mobility, gait, no edema. Atraumatic in appearance with no edema or cyanosis.   Skin: large, round, well-demarcated patch of alopecia to vertex of scalp. Central area appears atrophic and shiny, with hyperpigmented macules scattered throughout. No visible scaling or crusting. No exudate or bleeding present. SEE MEDIA        Assessment and Plan:  The patient is a 54 y.o. male who presents to the office for a new consultation for \"scar on head\". It turns out that the area of concern is a big, round patch of hair loss, and patient wants to discuss options for hair re-growth      -Explained PRP therapy to patient to promote regrowth of hair to the area, however I do not think that patient would be a good candidate due to total loss of hair to the area (and the scarring associated with the area). I do not think that this would provide the result that the patient desires.   -Provided patient with resources and contact information for hair restoration clinic in the [facility]. Encouraged follow-up with this clinic for consultation, as we do not perform hair transplantation in our practice.   -Continue to keep area covered when in the sun, or apply sunscreen to area  -The patient is to call the office with any " questions or concerns. All of the patient's questions were answered at this time and they agree with the plan of care.    Patient seen and evaluated by myself. Together we discussed an ideal assessment and plan. All questions were answered. I have spent 30 minutes with this patient today on patient evaluation and education. Patient to call with any questions or concerns.     Leesa Burk PA-C  [facility]          [1] No past medical history on file.  [2]   Past Surgical History:  Procedure Laterality Date    INCISION AND DRAINAGE OF WOUND N/A 9/18/2018    Procedure: INCISION AND DRAINAGE (I&D) SCROTUM;  Surgeon: Norberto Bettencourt MD;  Location: [facility];  Service: Urology   [3]   Current Outpatient Medications:     naproxen (Naprosyn) 500 mg tablet, Take 1 tablet (500 mg total) by mouth 2 (two) times a day as needed for moderate pain for up to 14 days (Patient not taking: Reported on 6/30/2025), Disp: 28 tablet, Rfl: 0

## (undated) DEVICE — GAUZE SPONGES,16 PLY: Brand: CURITY

## (undated) DEVICE — ABDOMINAL PAD: Brand: DERMACEA

## (undated) DEVICE — CURITY IDOFORM PACKING STRIP: Brand: CURITY